# Patient Record
Sex: FEMALE | Race: WHITE | NOT HISPANIC OR LATINO | Employment: UNEMPLOYED | URBAN - METROPOLITAN AREA
[De-identification: names, ages, dates, MRNs, and addresses within clinical notes are randomized per-mention and may not be internally consistent; named-entity substitution may affect disease eponyms.]

---

## 2017-07-06 ENCOUNTER — HOSPITAL ENCOUNTER (EMERGENCY)
Facility: MEDICAL CENTER | Age: 27
End: 2017-07-06
Attending: EMERGENCY MEDICINE
Payer: MEDICAID

## 2017-07-06 VITALS
SYSTOLIC BLOOD PRESSURE: 140 MMHG | RESPIRATION RATE: 20 BRPM | BODY MASS INDEX: 19.29 KG/M2 | HEIGHT: 66 IN | WEIGHT: 120 LBS | DIASTOLIC BLOOD PRESSURE: 73 MMHG | TEMPERATURE: 98.8 F | HEART RATE: 130 BPM | OXYGEN SATURATION: 96 %

## 2017-07-06 DIAGNOSIS — Z72.0 TOBACCO USE: ICD-10-CM

## 2017-07-06 DIAGNOSIS — R21 RASH: ICD-10-CM

## 2017-07-06 PROCEDURE — 99282 EMERGENCY DEPT VISIT SF MDM: CPT

## 2017-07-06 RX ORDER — PERMETHRIN 50 MG/G
CREAM TOPICAL
Qty: 1 TUBE | Refills: 0 | Status: SHIPPED | OUTPATIENT
Start: 2017-07-06 | End: 2017-07-24

## 2017-07-06 ASSESSMENT — PAIN SCALES - GENERAL: PAINLEVEL_OUTOF10: 7

## 2017-07-06 NOTE — DISCHARGE INSTRUCTIONS
Lice, Adult  Lice are tiny insects with claws on the ends of their legs. They are small parasites that live on the human body. Lice often make their home in a person's hair, such as hair on the head or in the pubic area. Pubic lice are sometimes referred to as crabs. Lice del angel from little round eggs, which are attached to the base of hairs. Lice eggs are also called nits.  Lice cause skin irritation and itching in the area of the infested hair. Although having lice can be annoying, it is not dangerous, and lice do not spread diseases. Treatment will usually clear up the symptoms within a few days.  CAUSES  Lice can spread from one person to another. Lice crawl. They do not fly or jump. To get lice, you must:  · Have very close contact with an infested person.  · Share infested items that touch your skin and hair. These include personal items, such as hats, العلي, brushes, towels, clothing, pillowcases, or sheets.  Pubic lice spread through sexual contact.  RISK FACTORS  Although having lice is more common among young children, anyone can get lice. Lice tend to thrive in warm weather, so that type of weather increases the risk.  SIGNS AND SYMPTOMS  · Itchiness in the affected area.  · Skin irritation.  · Feeling of something moving in the hair.  · Rash or sores on the skin.  · Tiny flakes or sacs near the scalp. These may be white, yellow, or tan.  · Tiny bugs crawling on the hair or scalp.  DIAGNOSIS  Diagnosis is based on your symptoms and a physical exam. Your health care provider will examine the affected area closely for live lice, tiny eggs (nits), and empty egg cases.  Eggs are typically yellow or tan in color. Empty egg cases are whitish. Lice are gray or brown.  TREATMENT  Treatment for lice includes:  · Using a hair rinse that contains a mild insecticide to kill lice. Your health care provider will recommend a prescription or over-the-counter rinse.  · Removing lice, eggs, and egg cases by using a comb or  tweezers.  · Washing and bagging your clothing and bedding.  Pregnant women should not use medicated shampoo or cream without first talking to their health care provider.  HOME CARE INSTRUCTIONS  · Apply medicated rinse as directed by your health care provider. Follow the label instructions carefully. General instructions for applying rinses may include these steps:  ¨ Put on an old shirt or underwear, or use an old towel in case of staining from the rinse.  ¨ Wash and towel-dry your head or pubic area before applying the rinse if directed to do so.  ¨ When your hair is dry, apply the rinse. Leave the rinse in your hair for the amount of time specified in the instructions.  ¨ Rinse the area with water.  ¨ Comb your wet hair close to the skin and down to the ends, removing any lice, eggs, or egg cases.  ¨ Do not wash the infested hair for 2 days while the medicine kills the lice.  ¨ Repeat the treatment if necessary in 7-10 days.  · Check for remaining lice, eggs, or egg cases every 2-3 days for 2 weeks or as directed. After treatment, the remaining lice should be moving more slowly.  · Remove any remaining lice, eggs, or egg cases using a fine-tooth comb.  · Wash all recently used towels, hats, scarves, jackets, bedding, and clothing in hot water.  · Place unwashable items that may have been exposed in closed plastic bags for 2 weeks.  · Soak all العلي and brushes in hot water for 10 minutes.  · Vacuum furniture to remove any loose hair. There is no need to use chemicals, which can be toxic. Lice survive only 1-2 days away from human skin. Eggs may survive only 1 week.  · For pubic lice, tell any sexual partners to seek treatment.  · For head lice, ask your health care provider if other family members or close contacts should be examined or treated as well.  · Keep all follow-up visits as directed by your health care provider. This is important.  SEEK MEDICAL CARE IF:  · You develop sores that look infected.  · Your  rash or sores do not go away in 1 week.  · The lice or eggs return or do not go away in spite of treatment.  MAKE SURE YOU:  · Understand these instructions.  · Will watch your condition.  · Will get help right away if you are not doing well or get worse.     This information is not intended to replace advice given to you by your health care provider. Make sure you discuss any questions you have with your health care provider.     Document Released: 12/18/2006 Document Revised: 01/08/2016 Document Reviewed: 05/05/2015  [employer] Interactive Patient Education ©2016 [employer]

## 2017-07-06 NOTE — ED NOTES
"28 y/o female ambulatory to triage with c/o itchy rash over whole body. Rash is not visible to triage nurse, several red spots noted on pt arm and back, pt states \"things are jumping off of me, they are everywhere\". Pt appears very anxious during triage, rapid speech, fidgeting.   "

## 2017-07-06 NOTE — ED NOTES
"Pt discharged, pt asked if she \"should put the cream in her ears and nose as she feels bugs in them\" I explained no and that her children need to be seen and not ot put cream on them as well.      No IV in place and 1 prescription given   "

## 2017-07-06 NOTE — ED AVS SNAPSHOT
Home Care Instructions                                                                                                                Esmer Perdomo   MRN: [id number]    Department:  [facility], Emergency Dept   Date of Visit:  7/6/2017            [facility], Emergency Dept    [address]    [place] [address]    Phone:  [phone]      You were seen by     Vinicio Harvey M.D.      Your Diagnosis Was     Rash     R21       Medication Information     Review all of your home medications and newly ordered medications with your primary doctor and/or pharmacist as soon as possible. Follow medication instructions as directed by your doctor and/or pharmacist.     Please keep your complete medication list with you and share with your physician. Update the information when medications are discontinued, doses are changed, or new medications (including over-the-counter products) are added; and carry medication information at all times in the event of emergency situations.               Medication List      START taking these medications        Instructions    Morning Afternoon Evening Bedtime    permethrin 5 % Crea   Commonly known as:  ELIMITE        Apply head to toe, wash off after 12 hours.  Repeat in 7 days if needed.  Disp QS for 2 treatments.                          ASK your doctor about these medications        Instructions    Morning Afternoon Evening Bedtime    acetaminophen 500 MG Tabs   Commonly known as:  TYLENOL        Take 500-1,000 mg by mouth every 6 hours as needed for Moderate Pain.   Dose:  500-1000 mg                        ibuprofen 600 MG Tabs   Commonly known as:  MOTRIN        Take 1 Tab by mouth every 6 hours as needed (Cramping).   Dose:  600 mg                        PRENATAL 1 PO        Take  by mouth.                             Where to Get Your Medications      You can get these medications from any pharmacy     Bring a paper prescription for each  of these medications    - permethrin 5 % Crea              Discharge Instructions       Lice, Adult  Lice are tiny insects with claws on the ends of their legs. They are small parasites that live on the human body. Lice often make their home in a person's hair, such as hair on the head or in the pubic area. Pubic lice are sometimes referred to as crabs. Lice del angel from little round eggs, which are attached to the base of hairs. Lice eggs are also called nits.  Lice cause skin irritation and itching in the area of the infested hair. Although having lice can be annoying, it is not dangerous, and lice do not spread diseases. Treatment will usually clear up the symptoms within a few days.  CAUSES  Lice can spread from one person to another. Lice crawl. They do not fly or jump. To get lice, you must:  · Have very close contact with an infested person.  · Share infested items that touch your skin and hair. These include personal items, such as hats, العلي, brushes, towels, clothing, pillowcases, or sheets.  Pubic lice spread through sexual contact.  RISK FACTORS  Although having lice is more common among young children, anyone can get lice. Lice tend to thrive in warm weather, so that type of weather increases the risk.  SIGNS AND SYMPTOMS  · Itchiness in the affected area.  · Skin irritation.  · Feeling of something moving in the hair.  · Rash or sores on the skin.  · Tiny flakes or sacs near the scalp. These may be white, yellow, or tan.  · Tiny bugs crawling on the hair or scalp.  DIAGNOSIS  Diagnosis is based on your symptoms and a physical exam. Your health care provider will examine the affected area closely for live lice, tiny eggs (nits), and empty egg cases.  Eggs are typically yellow or tan in color. Empty egg cases are whitish. Lice are gray or brown.  TREATMENT  Treatment for lice includes:  · Using a hair rinse that contains a mild insecticide to kill lice. Your health care provider will recommend a  prescription or over-the-counter rinse.  · Removing lice, eggs, and egg cases by using a comb or tweezers.  · Washing and bagging your clothing and bedding.  Pregnant women should not use medicated shampoo or cream without first talking to their health care provider.  HOME CARE INSTRUCTIONS  · Apply medicated rinse as directed by your health care provider. Follow the label instructions carefully. General instructions for applying rinses may include these steps:  ¨ Put on an old shirt or underwear, or use an old towel in case of staining from the rinse.  ¨ Wash and towel-dry your head or pubic area before applying the rinse if directed to do so.  ¨ When your hair is dry, apply the rinse. Leave the rinse in your hair for the amount of time specified in the instructions.  ¨ Rinse the area with water.  ¨ Comb your wet hair close to the skin and down to the ends, removing any lice, eggs, or egg cases.  ¨ Do not wash the infested hair for 2 days while the medicine kills the lice.  ¨ Repeat the treatment if necessary in 7-10 days.  · Check for remaining lice, eggs, or egg cases every 2-3 days for 2 weeks or as directed. After treatment, the remaining lice should be moving more slowly.  · Remove any remaining lice, eggs, or egg cases using a fine-tooth comb.  · Wash all recently used towels, hats, scarves, jackets, bedding, and clothing in hot water.  · Place unwashable items that may have been exposed in closed plastic bags for 2 weeks.  · Soak all العلي and brushes in hot water for 10 minutes.  · Vacuum furniture to remove any loose hair. There is no need to use chemicals, which can be toxic. Lice survive only 1-2 days away from human skin. Eggs may survive only 1 week.  · For pubic lice, tell any sexual partners to seek treatment.  · For head lice, ask your health care provider if other family members or close contacts should be examined or treated as well.  · Keep all follow-up visits as directed by your health care  provider. This is important.  SEEK MEDICAL CARE IF:  · You develop sores that look infected.  · Your rash or sores do not go away in 1 week.  · The lice or eggs return or do not go away in spite of treatment.  MAKE SURE YOU:  · Understand these instructions.  · Will watch your condition.  · Will get help right away if you are not doing well or get worse.     This information is not intended to replace advice given to you by your health care provider. Make sure you discuss any questions you have with your health care provider.     Document Released: 12/18/2006 Document Revised: 01/08/2016 Document Reviewed: 05/05/2015  [employer] Interactive Patient Education ©2016 [employer]            Patient Information     Patient Information    Following emergency treatment: all patient requiring follow-up care must return either to a private physician or a clinic if your condition worsens before you are able to obtain further medical attention, please return to the emergency room.     Billing Information    At [facility], we work to make the billing process streamlined for our patients.  Our Representatives are here to answer any questions you may have regarding your hospital bill.  If you have insurance coverage and have supplied your insurance information to us, we will submit a claim to your insurer on your behalf.  Should you have any questions regarding your bill, we can be reached online or by phone as follows:  Online: You are able pay your bills online or live chat with our representatives about any billing questions you may have. We are here to help Monday - Friday from 8:00am to 7:30pm and 9:00am - 12:00pm on Saturdays.  Please visit [email or web address]  for more information.   Phone:  [phone] or [phone]    Please note that your emergency physician, surgeon, pathologist, radiologist, anesthesiologist, and other specialists are not employed by [facility] and will therefore  bill separately for their services.  Please contact them directly for any questions concerning their bills at the numbers below:     Emergency Physician Services:  [phone]  [facility]:  [phone]  [facility]:  [phone]  [facility]:  [phone]    1. Your final bill may vary from the amount quoted upon discharge if all procedures are not complete at that time, or if your doctor has additional procedures of which we are not aware. You will receive an additional bill if you return to the Emergency Department at [facility] for suture removal regardless of the facility of which the sutures were placed.     2. Please arrange for settlement of this account at the emergency registration.    3. All self-pay accounts are due in full at the time of treatment.  If you are unable to meet this obligation then payment is expected within 4-5 days.     4. If you have had radiology studies (CT, X-ray, Ultrasound, MRI), you have received a preliminary result during your emergency department visit. Please contact the radiology department [phone] to receive a copy of your final result. Please discuss the Final result with your primary physician or with the follow up physician provided.     Crisis Hotline:  Hurt Crisis Hotline:  8-364-ZQUMFVF or 1-446.123.8450  [place] Crisis Hotline:    1-438.271.7130 or 295-123-9631         ED Discharge Follow Up Questions    1. In order to provide you with very good care, we would like to follow up with a phone call in the next few days.  May we have your permission to contact you?     YES /  NO    2. What is the best phone number to call you? (       )_____-__________    3. What is the best time to call you?      Morning  /  Afternoon  /  Evening                   Patient Signature:  ____________________________________________________________    Date:  ____________________________________________________________

## 2017-07-06 NOTE — ED AVS SNAPSHOT
7/6/2017    Esmer Perdomo  [address]  [place] [address]    Dear Esmer:    [facility] wants to ensure your discharge home is safe and you or your loved ones have had all of your questions answered regarding your care after you leave the hospital.    Below is a list of resources and contact information should you have any questions regarding your hospital stay, follow-up instructions, or active medical symptoms.    Questions or Concerns Regarding… Contact   Medical Questions Related to Your Discharge  (7 days a week, 8am-5pm) Contact a Nurse Care Coordinator   [phone]   Medical Questions Not Related to Your Discharge  (24 hours a day / 7 days a week)  Contact the Nurse Health Line   [phone]    Medications or Discharge Instructions Refer to your discharge packet   or contact your [facility] Primary Care Provider   [phone]   Follow-up Appointment(s) Schedule your appointment via [employer]   or contact Scheduling [phone]   Billing Review your statement via [employer]  or contact Billing [phone]   Medical Records Review your records via [employer]   or contact Medical Records [phone]     You may receive a telephone call within two days of discharge. This call is to make certain you understand your discharge instructions and have the opportunity to have any questions answered. You can also easily access your medical information, test results and upcoming appointments via the [employer] free online health management tool. You can learn more and sign up at [employer] Tonight. For assistance setting up your [employer] account, please call [phone].    Once again, we want to ensure your discharge home is safe and that you have a clear understanding of any next steps in your care. If you have any questions or concerns, please do not hesitate to contact us, we are here for you. Thank you for choosing [facility] for your healthcare needs.    Sincerely,    Your [facility] Healthcare Team

## 2017-07-06 NOTE — ED AVS SNAPSHOT
Farfetch Access Code: Activation code not generated  Current Farfetch Status: Patient Declined    Your email address is not on file at [facility].  Email Addresses are required for you to sign up for Farfetch, please contact [phone] to verify your personal information and to provide your email address prior to attempting to register for Farfetch.    Esmer Perdomo  [address]  [place] [address]    [employer]  A secure, online tool to manage your health information     [facility]’s Farfetch® is a secure, online tool that connects you to your personalized health information from the privacy of your home -- day or night - making it very easy for you to manage your healthcare. Once the activation process is completed, you can even access your medical information using the Farfetch daiana, which is available for free in the Apple Daiana store or Google Play store.     To learn more about Farfetch, visit [email or web address]    There are two levels of access available (as shown below):   My Chart Features  [facility] Primary Care Doctor [facility]  Specialists [facility]  Urgent  Care Non-[facility] Primary Care Doctor   Email your healthcare team securely and privately 24/7 X X X    Manage appointments: schedule your next appointment; view details of past/upcoming appointments X      Request prescription refills. X      View recent personal medical records, including lab and immunizations X X X X   View health record, including health history, allergies, medications X X X X   Read reports about your outpatient visits, procedures, consult and ER notes X X X X   See your discharge summary, which is a recap of your hospital and/or ER visit that includes your diagnosis, lab results, and care plan X X  X     How to register for [employer]:  Once your e-mail address has been verified, follow the following steps to sign up for [employer].     1. Go to  [email or web address]  2. Click on the Sign Up Now box, which takes you to the New  Member Sign Up page. You will need to provide the following information:  a. Enter your [employer] Access Code exactly as it appears at the top of this page. (You will not need to use this code after you’ve completed the sign-up process. If you do not sign up before the expiration date, you must request a new code.)   b. Enter your date of birth.   c. Enter your home email address.   d. Click Submit, and follow the next screen’s instructions.  3. Create a [employer] ID. This will be your DealTraction login ID and cannot be changed, so think of one that is secure and easy to remember.  4. Create a [employer] password. You can change your password at any time.  5. Enter your Password Reset Question and Answer. This can be used at a later time if you forget your password.   6. Enter your e-mail address. This allows you to receive e-mail notifications when new information is available in [employer].  7. Click Sign Up. You can now view your health information.    For assistance activating your [employer] account, call [phone]

## 2017-07-06 NOTE — ED NOTES
Room 61    Pt states that she feels like bugs are jumping off of her.    .  Chief Complaint   Patient presents with   • Rash   • Itching

## 2017-07-06 NOTE — ED PROVIDER NOTES
"ED Provider Note    CHIEF COMPLAINT  Chief Complaint   Patient presents with   • Rash   • Itching       HPI  Esmer Perdomo is a 27 y.o. female who presents complaining of infestation of bugs. She feels like they're crawling all over her body. She points to piece of lint on the pillowcase as evidence of one of the animals. She is not sure where they are from, nobody else has this. She went to another hospital last night and was told nothing was wrong with her. She presents here for 2nd opinion. She denies drug use, then states it's \"been a while,\" then as recently as this past month. Denies any fever. No other complaint.    PAST MEDICAL HISTORY  None reported      SOCIAL HISTORY  Social History   Substance Use Topics   • Smoking status: Light Tobacco Smoker   • Smokeless tobacco: None   • Alcohol Use: Yes      Comment: occ          SURGICAL HISTORY  Past Surgical History   Procedure Laterality Date   • Eye surgery Right    • Other orthopedic surgery Right      laceration closure       CURRENT MEDICATIONS    I have reviewed the nurses notes and/or the list brought with the patient.    ALLERGIES  No Known Allergies    REVIEW OF SYSTEMS  See HPI for further details. Review of systems as above, otherwise all other systems are negative.     PHYSICAL EXAM  VITAL SIGNS: /73 mmHg  Pulse 130  Temp(Src) 37.1 °C (98.8 °F)  Resp 20  Ht 1.676 m (5' 6\")  Wt 54.432 kg (120 lb)  BMI 19.38 kg/m2  SpO2 96%  LMP 07/04/2017  Breastfeeding? Unknown  Constitutional: Very anxious, fidgety. She is up and down off of the gurney multiple times while I'm in the room..  HENT: Mucus membranes moist.  Oropharynx is clear.  Eyes: Pupils equally round.  No scleral icterus.   Neck: Full nontender range of motion.  Lymphatic: No cervical lymphadenopathy noted.   Cardiovascular: Elevated heart rate is noted.  Thorax & Lungs: Breathing easily and room air  Skin: No purpura nor petechia noted. I see no evidence of infestation. There are " a few areas of excoriation where she has been scratching.  Extremities/Musculoskeletal: No sign of trauma.    Neurologic: Alert & oriented.  Strength and sensation is intact all around.  Gait is normal.  Psychiatric: Somewhat anxious.    MEDICAL RECORD  I have reviewed patient's medical record and pertinent results are listed above.    COURSE & MEDICAL DECISION MAKING  I have reviewed any medical record information, laboratory studies and radiographic results as noted above.  Patient presents with a question of a skin infestation. See no evidence of this at thio time. However, Out of an abundance of caution, I will go ahead and treat her with Elimite cream to cover for scabies. I pointed out that there is an association between drug use and her complaint. As noted above she initially denied any drug use, and then recanted on this.  We do not have instructions for scabies, the closest thing we have our about lice she is given instructions about this.  FINAL IMPRESSION  1. Rash    2. Tobacco use     3. Methamphetamine use  4. 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    Electronically signed by: Vinicio Harvey, 7/6/2017 3:10 PM

## 2017-07-24 ENCOUNTER — HOSPITAL ENCOUNTER (EMERGENCY)
Facility: MEDICAL CENTER | Age: 27
End: 2017-07-24
Attending: EMERGENCY MEDICINE
Payer: MEDICAID

## 2017-07-24 VITALS
BODY MASS INDEX: 19.13 KG/M2 | OXYGEN SATURATION: 100 % | WEIGHT: 119.05 LBS | HEIGHT: 66 IN | SYSTOLIC BLOOD PRESSURE: 126 MMHG | HEART RATE: 86 BPM | DIASTOLIC BLOOD PRESSURE: 84 MMHG | RESPIRATION RATE: 18 BRPM | TEMPERATURE: 98.5 F
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
## 2017-07-24 PROCEDURE — 99284 EMERGENCY DEPT VISIT MOD MDM: CPT

## 2017-07-24 RX ORDER — PERMETHRIN 50 MG/G
1 CREAM TOPICAL ONCE
Qty: 1 TUBE | Refills: 1 | Status: SHIPPED | OUTPATIENT
Start: 2017-07-24 | End: 2017-07-24

## 2017-07-24 RX ORDER — PERMETHRIN 50 MG/G
CREAM TOPICAL
Qty: 1 TUBE | Refills: 0 | Status: SHIPPED | OUTPATIENT
Start: 2017-07-24 | End: 2019-08-22

## 2017-07-24 ASSESSMENT — PAIN SCALES - GENERAL: PAINLEVEL_OUTOF10: 5

## 2017-07-24 NOTE — ED AVS SNAPSHOT
[employer] Access Code: Activation code not generated  Current [employer] Status: Patient Declined    Your email address is not on file at [facility].  Email Addresses are required for you to sign up for [employer], please contact [phone] to verify your personal information and to provide your email address prior to attempting to register for [employer].    Esmer Perdomo  [address]  [place] [address]    [employer]  A secure, online tool to manage your health information     [facility]’s [employer]® is a secure, online tool that connects you to your personalized health information from the privacy of your home -- day or night - making it very easy for you to manage your healthcare. Once the activation process is completed, you can even access your medical information using the [employer] daiana, which is available for free in the Apple Daiana store or Google Play store.     To learn more about [employer], visit [email or web address]    There are two levels of access available (as shown below):   My Chart Features  [facility] Primary Care Doctor [facility]  Specialists [facility]  Urgent  Care Non-[facility] Primary Care Doctor   Email your healthcare team securely and privately 24/7 X X X    Manage appointments: schedule your next appointment; view details of past/upcoming appointments X      Request prescription refills. X      View recent personal medical records, including lab and immunizations X X X X   View health record, including health history, allergies, medications X X X X   Read reports about your outpatient visits, procedures, consult and ER notes X X X X   See your discharge summary, which is a recap of your hospital and/or ER visit that includes your diagnosis, lab results, and care plan X X  X     How to register for [employer]:  Once your e-mail address has been verified, follow the following steps to sign up for [employer].     1. Go to  [email or web address]  2. Click on the Sign Up Now box, which takes you to the New  Member Sign Up page. You will need to provide the following information:  a. Enter your [employer] Access Code exactly as it appears at the top of this page. (You will not need to use this code after you’ve completed the sign-up process. If you do not sign up before the expiration date, you must request a new code.)   b. Enter your date of birth.   c. Enter your home email address.   d. Click Submit, and follow the next screen’s instructions.  3. Create a [employer] ID. This will be your [employer] login ID and cannot be changed, so think of one that is secure and easy to remember.  4. Create a [employer] password. You can change your password at any time.  5. Enter your Password Reset Question and Answer. This can be used at a later time if you forget your password.   6. Enter your e-mail address. This allows you to receive e-mail notifications when new information is available in [employer].  7. Click Sign Up. You can now view your health information.    For assistance activating your [employer] account, call [phone]

## 2017-07-24 NOTE — ED AVS SNAPSHOT
7/24/2017    Esmer Perdomo  [address]  [place] [address]    Dear Esmer:    [facility] wants to ensure your discharge home is safe and you or your loved ones have had all of your questions answered regarding your care after you leave the hospital.    Below is a list of resources and contact information should you have any questions regarding your hospital stay, follow-up instructions, or active medical symptoms.    Questions or Concerns Regarding… Contact   Medical Questions Related to Your Discharge  (7 days a week, 8am-5pm) Contact a Nurse Care Coordinator   [phone]   Medical Questions Not Related to Your Discharge  (24 hours a day / 7 days a week)  Contact the Nurse Health Line   [phone]    Medications or Discharge Instructions Refer to your discharge packet   or contact your [facility] Primary Care Provider   [phone]   Follow-up Appointment(s) Schedule your appointment via [email or web address]   or contact Scheduling [phone]   Billing Review your statement via [email or web address]  or contact Billing [phone]   Medical Records Review your records via [email or web address]   or contact Medical Records [phone]     You may receive a telephone call within two days of discharge. This call is to make certain you understand your discharge instructions and have the opportunity to have any questions answered. You can also easily access your medical information, test results and upcoming appointments via the [email or web address] free online health management tool. You can learn more and sign up at [email or web address]. For assistance setting up your [email or web address] account, please call [phone].    Once again, we want to ensure your discharge home is safe and that you have a clear understanding of any next steps in your care. If you have any questions or concerns, please do not hesitate to contact us, we are here for you. Thank you for choosing [facility] for your healthcare needs.    Sincerely,    Your [facility] Healthcare Team

## 2017-07-24 NOTE — ED AVS SNAPSHOT
Home Care Instructions                                                                                                                Esmer Perdomo   MRN: [id number]    Department:  [facility], Emergency Dept   Date of Visit:  7/24/2017            [facility], Emergency Dept    [address]    [place] [address]    Phone:  [phone]      You were seen by     Diana Martínez M.D.      Your Diagnosis Was     Bug bites, initial encounter     W57.XXXA       Follow-up Information     1. Follow up with primary care. Call in 2 days.    Why:  for recheck      Medication Information     Review all of your home medications and newly ordered medications with your primary doctor and/or pharmacist as soon as possible. Follow medication instructions as directed by your doctor and/or pharmacist.     Please keep your complete medication list with you and share with your physician. Update the information when medications are discontinued, doses are changed, or new medications (including over-the-counter products) are added; and carry medication information at all times in the event of emergency situations.               Medication List      START taking these medications        Instructions    Morning Afternoon Evening Bedtime    Pyrethrins-Piperonyl Butoxide 0.3-3 % Liqd   Commonly known as:  RID        Apply 1 Application to affected area(s) Once for 1 dose.   Dose:  1 Application                          CHANGE how you take these medications        Instructions    Morning Afternoon Evening Bedtime    * permethrin 5 % Crea   What changed:  You were already taking a medication with the same name, and this prescription was added. Make sure you understand how and when to take each.   Commonly known as:  ELIMITE        Apply 1 Application to affected area(s) Once for 1 dose.   Dose:  1 Application                        * permethrin 5 % Crea   What changed:  Another medication  with the same name was added. Make sure you understand how and when to take each.   Commonly known as:  ELIMITE        Apply head to toe, wash off after 12 hours.  Repeat in 7 days if needed.  Disp QS for 2 treatments.                        * Notice:  This list has 2 medication(s) that are the same as other medications prescribed for you. Read the directions carefully, and ask your doctor or other care provider to review them with you.      ASK your doctor about these medications        Instructions    Morning Afternoon Evening Bedtime    acetaminophen 500 MG Tabs   Commonly known as:  TYLENOL        Take 500-1,000 mg by mouth every 6 hours as needed for Moderate Pain.   Dose:  500-1000 mg                        ibuprofen 600 MG Tabs   Commonly known as:  MOTRIN        Take 1 Tab by mouth every 6 hours as needed (Cramping).   Dose:  600 mg                        PRENATAL 1 PO        Take  by mouth.                             Where to Get Your Medications      You can get these medications from any pharmacy     Bring a paper prescription for each of these medications    - permethrin 5 % Crea  - permethrin 5 % Crea  - Pyrethrins-Piperonyl Butoxide 0.3-3 % Liqd              Discharge Instructions       Insect Bite  Mosquitoes, flies, fleas, bedbugs, and many other insects can bite. Insect bites are different from insect stings. A sting is when venom is injected into the skin. Some insect bites can transmit infectious diseases.  SYMPTOMS   Insect bites usually turn red, swell, and itch for 2 to 4 days. They often go away on their own.  TREATMENT   Your caregiver may prescribe antibiotic medicines if a bacterial infection develops in the bite.  HOME CARE INSTRUCTIONS  · Do not scratch the bite area.  · Keep the bite area clean and dry. Wash the bite area thoroughly with soap and water.  · Put ice or cool compresses on the bite area.  ¨ Put ice in a plastic bag.  ¨ Place a towel between your skin and the bag.  ¨ Leave  the ice on for 20 minutes, 4 times a day for the first 2 to 3 days, or as directed.  · You may apply a baking soda paste, cortisone cream, or calamine lotion to the bite area as directed by your caregiver. This can help reduce itching and swelling.  · Only take over-the-counter or prescription medicines as directed by your caregiver.  · If you are given antibiotics, take them as directed. Finish them even if you start to feel better.  You may need a tetanus shot if:  · You cannot remember when you had your last tetanus shot.  · You have never had a tetanus shot.  · The injury broke your skin.  If you get a tetanus shot, your arm may swell, get red, and feel warm to the touch. This is common and not a problem. If you need a tetanus shot and you choose not to have one, there is a rare chance of getting tetanus. Sickness from tetanus can be serious.  SEEK IMMEDIATE MEDICAL CARE IF:   · You have increased pain, redness, or swelling in the bite area.  · You see a red line on the skin coming from the bite.  · You have a fever.  · You have joint pain.  · You have a headache or neck pain.  · You have unusual weakness.  · You have a rash.  · You have chest pain or shortness of breath.  · You have abdominal pain, nausea, or vomiting.  · You feel unusually tired or sleepy.  MAKE SURE YOU:   · Understand these instructions.  · Will watch your condition.  · Will get help right away if you are not doing well or get worse.     This information is not intended to replace advice given to you by your health care provider. Make sure you discuss any questions you have with your health care provider.     Document Released: 01/25/2006 Document Revised: 03/11/2013 Document Reviewed: 07/18/2012  [employer] Interactive Patient Education ©2016 [employer]            Patient Information     Patient Information    Following emergency treatment: all patient requiring follow-up care must return either to a private physician or a clinic if your  condition worsens before you are able to obtain further medical attention, please return to the emergency room.     Billing Information    At [facility], we work to make the billing process streamlined for our patients.  Our Representatives are here to answer any questions you may have regarding your hospital bill.  If you have insurance coverage and have supplied your insurance information to us, we will submit a claim to your insurer on your behalf.  Should you have any questions regarding your bill, we can be reached online or by phone as follows:  Online: You are able pay your bills online or live chat with our representatives about any billing questions you may have. We are here to help Monday - Friday from 8:00am to 7:30pm and 9:00am - 12:00pm on Saturdays.  Please visit [email or web address]  for more information.   Phone:  [phone] or [phone]    Please note that your emergency physician, surgeon, pathologist, radiologist, anesthesiologist, and other specialists are not employed by [facility] and will therefore bill separately for their services.  Please contact them directly for any questions concerning their bills at the numbers below:     Emergency Physician Services:  [phone]  [facility]:  [phone]  [facility]:  [phone]  [facility]:  [phone]    1. Your final bill may vary from the amount quoted upon discharge if all procedures are not complete at that time, or if your doctor has additional procedures of which we are not aware. You will receive an additional bill if you return to the Emergency Department at [facility] for suture removal regardless of the facility of which the sutures were placed.     2. Please arrange for settlement of this account at the emergency registration.    3. All self-pay accounts are due in full at the time of treatment.  If you are unable to meet this obligation  then payment is expected within 4-5 days.     4. If you have had radiology studies (CT, X-ray, Ultrasound, MRI), you have received a preliminary result during your emergency department visit. Please contact the radiology department [phone] to receive a copy of your final result. Please discuss the Final result with your primary physician or with the follow up physician provided.     Crisis Hotline:  [place] Crisis Hotline:  4-375-MXSNDLQ or 1-230.293.6497  [place] Crisis Hotline:    1-123.510.6023 or 693-563-5969         ED Discharge Follow Up Questions    1. In order to provide you with very good care, we would like to follow up with a phone call in the next few days.  May we have your permission to contact you?     YES /  NO    2. What is the best phone number to call you? (       )_____-__________    3. What is the best time to call you?      Morning  /  Afternoon  /  Evening                   Patient Signature:  ____________________________________________________________    Date:  ____________________________________________________________

## 2017-07-25 NOTE — ED NOTES
Pt speaking very rapidly, states has been seeing bugs come out of hair and eyebrow, has not followed up or used any of the treatments received from previous visits.  Pt denies SI, denies hx of hallucinations.

## 2017-07-25 NOTE — DISCHARGE INSTRUCTIONS
Insect Bite  Mosquitoes, flies, fleas, bedbugs, and many other insects can bite. Insect bites are different from insect stings. A sting is when venom is injected into the skin. Some insect bites can transmit infectious diseases.  SYMPTOMS   Insect bites usually turn red, swell, and itch for 2 to 4 days. They often go away on their own.  TREATMENT   Your caregiver may prescribe antibiotic medicines if a bacterial infection develops in the bite.  HOME CARE INSTRUCTIONS  · Do not scratch the bite area.  · Keep the bite area clean and dry. Wash the bite area thoroughly with soap and water.  · Put ice or cool compresses on the bite area.  ¨ Put ice in a plastic bag.  ¨ Place a towel between your skin and the bag.  ¨ Leave the ice on for 20 minutes, 4 times a day for the first 2 to 3 days, or as directed.  · You may apply a baking soda paste, cortisone cream, or calamine lotion to the bite area as directed by your caregiver. This can help reduce itching and swelling.  · Only take over-the-counter or prescription medicines as directed by your caregiver.  · If you are given antibiotics, take them as directed. Finish them even if you start to feel better.  You may need a tetanus shot if:  · You cannot remember when you had your last tetanus shot.  · You have never had a tetanus shot.  · The injury broke your skin.  If you get a tetanus shot, your arm may swell, get red, and feel warm to the touch. This is common and not a problem. If you need a tetanus shot and you choose not to have one, there is a rare chance of getting tetanus. Sickness from tetanus can be serious.  SEEK IMMEDIATE MEDICAL CARE IF:   · You have increased pain, redness, or swelling in the bite area.  · You see a red line on the skin coming from the bite.  · You have a fever.  · You have joint pain.  · You have a headache or neck pain.  · You have unusual weakness.  · You have a rash.  · You have chest pain or shortness of breath.  · You have abdominal pain,  nausea, or vomiting.  · You feel unusually tired or sleepy.  MAKE SURE YOU:   · Understand these instructions.  · Will watch your condition.  · Will get help right away if you are not doing well or get worse.     This information is not intended to replace advice given to you by your health care provider. Make sure you discuss any questions you have with your health care provider.     Document Released: 01/25/2006 Document Revised: 03/11/2013 Document Reviewed: 07/18/2012  [employer] Interactive Patient Education ©2016 [employer]

## 2017-07-25 NOTE — ED NOTES
Discharge instructions and rx given, educated on when to return to ed. Pt verbalized instructions.

## 2017-07-25 NOTE — ED PROVIDER NOTES
"ED Provider Note    CHIEF COMPLAINT  Chief Complaint   Patient presents with   • Other     Pt c/o feeling \"bugs in my hair\"   • Anxiety     \"phobia of bugs preying on you\"       HPI  Esmer Perdomo is a 27 y.o. female who presents complaining of continued infestations with bedbugs. She states she has been very anxious because she has gotten bugs out of her here. She has been through permethrin treatment, but feels she needs another one. She states that she has washed all her clothes. She is worried that her daughters will get bugs on them as well. she is very anxious. She denies suicidal ideation, drug use, smoking or alcohol use.    REVIEW OF SYSTEMS  See HPI for further details. No fevers or chills. No purulent drainage from her rash.  PAST MEDICAL HISTORY  History reviewed. No pertinent past medical history.    SOCIAL HISTORY  Social History     Social History   • Marital Status:      Spouse Name: N/A   • Number of Children: N/A   • Years of Education: N/A     Social History Main Topics   • Smoking status: Current Every Day Smoker   • Smokeless tobacco: None   • Alcohol Use: Yes   • Drug Use: Yes      Comment: one month ago    • Sexual Activity: Not Asked     Other Topics Concern   • None     Social History Narrative   , immunizations up to date, no smoking in the house    SURGICAL HISTORY  Past Surgical History   Procedure Laterality Date   • Eye surgery Right    • Other orthopedic surgery Right      laceration closure       CURRENT MEDICATIONS  Home Medications     **Home medications have not yet been reviewed for this encounter**          ALLERGIES  No Known Allergies    PHYSICAL EXAM  VITAL SIGNS: /75 mmHg  Pulse 85  Temp(Src) 36.9 °C (98.5 °F)  Resp 18  Ht 1.676 m (5' 6\")  Wt 54 kg (119 lb 0.8 oz)  BMI 19.22 kg/m2  SpO2 97%  LMP 07/04/2017  Constitutional: Well developed, Well nourished, No acute distress, Non-toxic appearance.   HEENT: Normocephalic, Atraumatic,  external ears normal, " pharynx pink,  Mucous  Membranes moist, No rhinorrhea or mucosal edema  Eyes: PERRL, EOMI, Conjunctiva normal, No discharge.   Neck: Normal range of motion, No tenderness, Supple, No stridor.   Lymphatic: No lymphadenopathy  .   Skin: Warm, Dry, No erythema, positive for some mild erythema on her chin and left upper lip. She has some areas where she's been scratching in her scalp and hairline. There is no purulent drainage, tenderness or warmth. She has no other rashes or areas of possible infestation. Her back and chest and legs are not erythematous without any rash or lesion. She has no joint swelling.  Extremities: Equal, intact distal pulses, No cyanosis, No tenderness.   Musculoskeletal: Good range of motion in all major joints. No tenderness to palpation or major deformities noted. ,    Psychiatric: Anxious with pressured speech, no suicidal or homicidal ideation          COURSE & MEDICAL DECISION MAKING  Pertinent Labs & Imaging studies reviewed. (See chart for details)  Bedbugs versus anxiety    This time, the patient is so anxious that she may still have bedbugs. I will give her another treatment of rid and permethrin cream. She is not suicidal or homicidal. She is not a harm to herself or others. She denies meth use. At this time, I will discharge her home and advised her to follow up with her primary care physician for recheck and wash all of her clothing with bleach and water or have her house fumigated.        Patient has had high blood pressure while in the emergency department, felt likely secondary to medical condition. Counseled patient to monitor blood pressure at home and follow up with primary care physician.    FINAL IMPRESSION  1. Bug bites, initial encounter  2. Anxiety          PLAN/DISPOSITION  Discharged          Electronically signed by: Diana Martínez, 7/24/2017 8:39 PM

## 2017-07-25 NOTE — ED NOTES
"Chief Complaint   Patient presents with   • Other     Pt c/o feeling \"bugs in my hair\"   • Anxiety     \"phobia of bugs preying on you\"     /75 mmHg  Pulse 85  Temp(Src) 36.9 °C (98.5 °F)  Resp 18  Ht 1.676 m (5' 6\")  Wt 54 kg (119 lb 0.8 oz)  BMI 19.22 kg/m2  SpO2 97%  LMP 07/04/2017    "

## 2019-06-12 ENCOUNTER — INITIAL PRENATAL (OUTPATIENT)
Dept: OBGYN | Facility: CLINIC | Age: 29
End: 2019-06-12
Payer: MEDICAID

## 2019-06-12 VITALS — BODY MASS INDEX: 23.08 KG/M2 | WEIGHT: 143 LBS | DIASTOLIC BLOOD PRESSURE: 60 MMHG | SYSTOLIC BLOOD PRESSURE: 104 MMHG

## 2019-06-12 DIAGNOSIS — N91.2 AMENORRHEA: ICD-10-CM

## 2019-06-12 DIAGNOSIS — Z3A.18 18 WEEKS GESTATION OF PREGNANCY: ICD-10-CM

## 2019-06-12 PROCEDURE — 99203 OFFICE O/P NEW LOW 30 MIN: CPT | Performed by: OBSTETRICS & GYNECOLOGY

## 2019-06-12 NOTE — PROGRESS NOTES
Chief complaint: Amenorrhea    Esmer Perdomo,  29 y.o.  female with No LMP recorded. Patient is pregnant. presents today with complaint of dysfunctional uterine bleeding.    Patient reports last menstrual period was 2019.  Due date of 2019    Doing well overall, no complaints    Reports fetal movement    Subjective : Patient presents to the office for absent menses.    Nausea/Vomiting: No    Abdominal /pelvic cramping: No  Vaginal bleeding: No  Positive home UPT yes  Other symptoms: None    Pertinent positives documented in HPI and all other systems reviewed & are negative    OB History    Para Term  AB Living   5 2 2   2 2   SAB TAB Ectopic Molar Multiple Live Births     2     0 2      # Outcome Date GA Lbr Froy/2nd Weight Sex Delivery Anes PTL Lv   5 Current            4 Term 12/15/15 39w5d  3.365 kg (7 lb 6.7 oz) F Vag-Spont EPI  NATE   3 Term 12 38w0d  3.912 kg (8 lb 10 oz) M Vag-Vacuum   NATE   2 TAB            1 TAB                   Past Gyn history: last pap unsure, hx STDs none    Past Medical History:   Diagnosis Date   • Anxiety    • Depression        Past Surgical History:   Procedure Laterality Date   • EYE SURGERY Right    • OTHER ORTHOPEDIC SURGERY Right     laceration closure       Meds: Daily multivitamin    Allergies: Patient has no known allergies.    Physical Exam:    /60   Wt 64.9 kg (143 lb)   BMI 23.08 kg/m²   Gen: well-appearing, well-hydrated, well-nourished  HEENT: normal;   PERRLA, EOMI,  sclera clear  Lungs: Clear to auscultation  Heart: RRR No M  Abd: abdomen is soft without significant tenderness, masses, organomegaly or guarding  Pelvic: Deferred  Ext: NT bilaterally, no cyanosis, clubbing or edema    No results found for this or any previous visit (from the past 336 hour(s)).    Ultrasound: Trans-abdominal US performed and per my read:    Indication: Dating    Findings: Single intrauterine pregnancy at 18 weeks and 5 days  gestational age noted.  Positive fetal cardiac activity @144 BPM.   Right ovary no mass. Left Ovary no mass. Cervical length 3.8 cm.   No free fluid in the cul-de-sac.    Impression: viable IUP @18 weeks and 5 days. EDC by US of 2019    Ultrasound consistent with last menstrual..  Final due date 2019      Assessment:  29 y.o.   amenorrhea  Pregnancy exam/test positive    Plan:  4 weeks for new OB appt  Pap smear and cervical cultures at new OB visit  Normal pregnancy symptoms discussed  SAB/labor precautions educated  Prenatal labs ordered.  Discussed with patient quad screen for trisomy and neural tube defects.  False positive pickup rate discussed with patient.  Patient will obtain.  Order given  Drink at least 2 liters of water daily  Exercise 30 minutes daily  Call MD fitzgerald/ questions or concerns    All questions answered

## 2019-06-12 NOTE — PROGRESS NOTES
Pt here today for   Pt states feeling flutters, was on depo while incarcerated  Reports +  Good # [phone]  Pharmacy Confirmed.

## 2019-06-26 DIAGNOSIS — Z34.82 ENCOUNTER FOR SUPERVISION OF OTHER NORMAL PREGNANCY IN SECOND TRIMESTER: ICD-10-CM

## 2019-06-26 RX ORDER — VITAMIN A ACETATE, BETA CAROTENE, ASCORBIC ACID, CHOLECALCIFEROL, .ALPHA.-TOCOPHEROL ACETATE, DL-, THIAMINE MONONITRATE, RIBOFLAVIN, NIACINAMIDE, PYRIDOXINE HYDROCHLORIDE, FOLIC ACID, CYANOCOBALAMIN, CALCIUM CARBONATE, FERROUS FUMARATE, ZINC OXIDE, CUPRIC OXIDE 3080; 12; 120; 400; 1; 1.84; 3; 20; 22; 920; 25; 200; 27; 10; 2 [IU]/1; UG/1; MG/1; [IU]/1; MG/1; MG/1; MG/1; MG/1; MG/1; [IU]/1; MG/1; MG/1; MG/1; MG/1; MG/1
1 TABLET, FILM COATED ORAL
Qty: 30 TAB | Refills: 12 | Status: SHIPPED | OUTPATIENT
Start: 2019-06-26 | End: 2019-08-22

## 2019-06-26 NOTE — TELEPHONE ENCOUNTER
Ancelmo Leon  Pt called to verify on her u/s appt time on 6/27/19 was informed and instructed for it.    Also needs Rx for prenatals to pharmacy on file.    Has not other questions Rx place    compensated    Continue present Rx  Cardiac w/u in progress  CMP in am      Trac T Le  5/22/2017

## 2019-07-01 ENCOUNTER — INITIAL PRENATAL (OUTPATIENT)
Dept: OBGYN | Facility: CLINIC | Age: 29
End: 2019-07-01
Payer: MEDICAID

## 2019-07-01 ENCOUNTER — HOSPITAL ENCOUNTER (OUTPATIENT)
Facility: MEDICAL CENTER | Age: 29
End: 2019-07-01
Attending: NURSE PRACTITIONER
Payer: MEDICAID

## 2019-07-01 VITALS — DIASTOLIC BLOOD PRESSURE: 60 MMHG | SYSTOLIC BLOOD PRESSURE: 124 MMHG | BODY MASS INDEX: 23.08 KG/M2 | WEIGHT: 143 LBS

## 2019-07-01 DIAGNOSIS — Z86.59 HISTORY OF DEPRESSION: ICD-10-CM

## 2019-07-01 DIAGNOSIS — Z34.92 PRENATAL CARE IN SECOND TRIMESTER: ICD-10-CM

## 2019-07-01 DIAGNOSIS — O99.330 TOBACCO USE AFFECTING PREGNANCY, ANTEPARTUM: ICD-10-CM

## 2019-07-01 DIAGNOSIS — Z86.59 HISTORY OF ANXIETY: ICD-10-CM

## 2019-07-01 LAB
APPEARANCE UR: NORMAL
BILIRUB UR STRIP-MCNC: NORMAL MG/DL
COLOR UR AUTO: NORMAL
GLUCOSE UR STRIP.AUTO-MCNC: NEGATIVE MG/DL
KETONES UR STRIP.AUTO-MCNC: NEGATIVE MG/DL
LEUKOCYTE ESTERASE UR QL STRIP.AUTO: NEGATIVE
NITRITE UR QL STRIP.AUTO: NEGATIVE
PH UR STRIP.AUTO: 6.5 [PH] (ref 5–8)
PROT UR QL STRIP: NEGATIVE MG/DL
RBC UR QL AUTO: NEGATIVE
SP GR UR STRIP.AUTO: 1.02
UROBILINOGEN UR STRIP-MCNC: NORMAL MG/DL

## 2019-07-01 PROCEDURE — 88175 CYTOPATH C/V AUTO FLUID REDO: CPT

## 2019-07-01 PROCEDURE — 87624 HPV HI-RISK TYP POOLED RSLT: CPT

## 2019-07-01 PROCEDURE — 0500F INITIAL PRENATAL CARE VISIT: CPT | Performed by: NURSE PRACTITIONER

## 2019-07-01 PROCEDURE — 87591 N.GONORRHOEAE DNA AMP PROB: CPT

## 2019-07-01 PROCEDURE — 81002 URINALYSIS NONAUTO W/O SCOPE: CPT | Performed by: NURSE PRACTITIONER

## 2019-07-01 PROCEDURE — 87491 CHLMYD TRACH DNA AMP PROBE: CPT

## 2019-07-01 NOTE — LETTER
Cystic Fibrosis Carrier Testing  Esmer Perdomo    The following information is about a blood test that can be done to determine if you and/or your partner carry the gene for cystic fibrosis.    WHAT IS CYSTIC FIBROSIS?  · Cystic fibrosis (CF) is an inherited disease that affects more than 25,000 [place] children and young adults.  · Symptoms of CF vary but include lung congestion, pneumonia, diarrhea and poor growth.  Most people with CF have severe medical problems and some die at a young age.  Others have so few symptoms they are unaware they have CF.  · CF does not affect intelligence.  · Although there is no cure for CF at this time, scientists are making progress in improving treatment and in searching for a cure.  In the past many people with CF  at a very young age.  Today, many are living into their 20’s and 30’s.    IS THERE A CHANCE MY BABY COULD HAVE CYSTIC FIBROSIS?  · You can have a child with CF even if there is no history in your family (see chart below).  · CF testing can help determine if you are a carrier and at risk to have a child with CF.  Note: if both parents are carriers, there is a 1 in 4 (25%) chance with each pregnancy that they will have a child with CF.  · Carriers have one normal CF gene and one altered CF gene.  · People with CF have two altered CF genes.  · Most people have two normal copies of the CF gene.    Approximate risk that a couple with no family history of cystic fibrosis will have a child with cystic fibrosis:    Ethnic background / Risk     couple:  1 in 2,500   couple:  1 in 15,000            couple:  1 in 8,000     American couple:  1 in 32,000     WHAT TESTING IS AVAILABLE?  · There is a blood test that can be done to find out if you or your partner is a carrier.  · It is important to understand that CF carrier testing does not detect all CF carriers.  · If the test shows that you are both CF carriers, you unborn baby can be  tested to find out if the baby has CF.    HOW MUCH DOES IT COST TO HAVE CYSTIC FIBROSIS CARRIER TESTING?  · Cost and insurance coverage for CF carrier testing vary depending upon the laboratory used and your insurance policy.  · The average cost for CF carrier testing is $300 per person.  · Your genetic counselor can provide you with more information about cystic fibrosis carrier testing.    _____  Yes, I am interested in discussing carrier testing with a genetic counselor.    _____  No, I am not interested in CF carrier testing or in receiving more information about CF carrier testing.      Client signature: ________________________________________  7/1/2019

## 2019-07-01 NOTE — PROGRESS NOTES
NOB today. Had  6/12/19  Will be getting her prescription for PNV  Last pap. not sure  Phone # [phone]  Pharmacy confirmed  PNP and AFP not done yet. Will go ASAP  (+) FM, denies VB or LOF  C/o Some cramping.

## 2019-07-01 NOTE — PROGRESS NOTES
Subjective:   Esmer Perdomo is a 29 y.o.  who presents for her new OB exam.  She is 21w3d with an ROSEMARIE of Estimated Date of Delivery: 19 by  at 18 weeks. She is feeling well and has no concerns at this time. Denies VB, LOF, contractions or pain. No ER visits or previous care in this pregnancy. Denies dysuria, vaginal DC, fever. Reports fetal movement. Desires AFP.  Declines CF.      History reviewed. No pertinent past medical history.    Psych Hx: Reports does counseling once a week for anxiety. Denies any other psych issues.     Past Surgical History:   Procedure Laterality Date   • EYE SURGERY Right    • OTHER ORTHOPEDIC SURGERY Right     laceration closure        OB History    Para Term  AB Living   5 2 2   2 2   SAB TAB Ectopic Molar Multiple Live Births     2     0 2      # Outcome Date GA Lbr Froy/2nd Weight Sex Delivery Anes PTL Lv   5 Current            4 Term 12/15/15 39w5d  3.365 kg (7 lb 6.7 oz) F Vag-Spont EPI  NATE   3 Term 12 38w0d  3.912 kg (8 lb 10 oz) M Vag-Vacuum   NATE   2 TAB            1 TAB                    Gynecological Hx: Denies any hx of STIs, including HSV. Denies any vulvovaginal disorders and no hx of abnormal cervical cytology. Last pap a while ago.     Sexual Hx: One current male partner, who is FOB     Contraceptive Hx: Has used shot in the past and has since discontinued use.     Family History   Problem Relation Age of Onset   • Heart Disease Father    • Diabetes Father      Denies any genetic disorders in family history.     Social History     Social History   • Marital status:      Spouse name: N/A   • Number of children: N/A   • Years of education: N/A     Occupational History   • Not on file.     Social History Main Topics   • Smoking status: Current Every Day Smoker   • Smokeless tobacco: Never Used      Comment: pack at day or every 2 days   • Alcohol use No   • Drug use: No      Comment: one month ago    • Sexual activity: Yes      Partners: Male     Other Topics Concern   • Not on file     Social History Narrative   • No narrative on file       FOB is involved and does not lives with Esmer Perdomo.  She lives with her dad. Pregnancy is unplanned but desired.    She is currently not working, denies any heavy lifting or exposure to potential teratogens like environmental or occupational toxins.   Denies alcohol use, drug use, or tobacco use in pregnancy.   Denies any current or hx of sexual, emotional or physical abuse or trauma.     Current Medications: PNV   Allergies: Denies allergies to medications, food, or environmental allergies    Objective:      Vitals:    07/01/19 1607   BP: 124/60   Weight: 64.9 kg (143 lb)        See Prenatal Physical and Prenatal Vitals  UA WNL today      Assessment:      1.  IUP @ 21w3d per       2.  S=D      3.  See problem list as follows       Patient Active Problem List    Diagnosis Date Noted   • History of depression and anxiety 07/01/2019         Plan:   -  GC/CT & pap done today   - Prenatal labs already ordered; need to be done  - Reviewed quitting tobacco options and will let us know what she decides next time   - Discussed PNV, nutrition, adequate water intake, and exercise/weight gain in pregnancy  - NOB informational packet with anticipatory guidance given  - Information on Centering Pregnancy given, pt appropriate  - S/sx of pregnancy warning signs and PTL precautions given  - Complete OB US in ASAP wks  - Return to [facility] in 4 wks

## 2019-07-04 LAB
C TRACH DNA GENITAL QL NAA+PROBE: NEGATIVE
CYTOLOGY REG CYTOL: NORMAL
N GONORRHOEA DNA GENITAL QL NAA+PROBE: NEGATIVE
SPECIMEN SOURCE: NORMAL

## 2019-07-06 LAB
HPV HR 12 DNA CVX QL NAA+PROBE: NEGATIVE
HPV16 DNA SPEC QL NAA+PROBE: NEGATIVE
HPV18 DNA SPEC QL NAA+PROBE: NEGATIVE
SPECIMEN SOURCE: NORMAL

## 2019-07-08 PROBLEM — R87.610 ASCUS OF CERVIX WITH NEGATIVE HIGH RISK HPV: Status: ACTIVE | Noted: 2019-07-08

## 2019-07-17 ENCOUNTER — APPOINTMENT (OUTPATIENT)
Dept: RADIOLOGY | Facility: IMAGING CENTER | Age: 29
End: 2019-07-17
Attending: OBSTETRICS & GYNECOLOGY
Payer: MEDICAID

## 2019-07-17 DIAGNOSIS — Z3A.18 18 WEEKS GESTATION OF PREGNANCY: ICD-10-CM

## 2019-07-17 DIAGNOSIS — N91.2 AMENORRHEA: ICD-10-CM

## 2019-07-17 PROCEDURE — 76805 OB US >/= 14 WKS SNGL FETUS: CPT | Performed by: OBSTETRICS & GYNECOLOGY

## 2019-08-22 ENCOUNTER — ROUTINE PRENATAL (OUTPATIENT)
Dept: OBGYN | Facility: CLINIC | Age: 29
End: 2019-08-22
Payer: MEDICAID

## 2019-08-22 VITALS — WEIGHT: 159 LBS | BODY MASS INDEX: 25.66 KG/M2 | SYSTOLIC BLOOD PRESSURE: 110 MMHG | DIASTOLIC BLOOD PRESSURE: 58 MMHG

## 2019-08-22 DIAGNOSIS — Z34.80 SUPERVISION OF OTHER NORMAL PREGNANCY: Primary | ICD-10-CM

## 2019-08-22 PROCEDURE — 90040 PR PRENATAL FOLLOW UP: CPT | Performed by: NURSE PRACTITIONER

## 2019-08-22 NOTE — PROGRESS NOTES
Pt here today for OB follow up  Pt states no complaints   Reports +FM  Good # [phone]  Pharmacy Confirmed.  Chaperone offered and not indicated.   Pt given AMAURI sheet and instructions.  Pt declines BTL  Pt declines TDAP  Pt given 1 hr GTT and instructions.  Pt states will do labs ASAP.

## 2019-08-22 NOTE — PATIENT INSTRUCTIONS
P:  1.  PP contraception declines BTL.         2.  Instructions given on FKCs.          3.  Questions answered.          4.  Encouraged pt to tour L&D.          5.  Encourage adequate water intake.        6.  1hr GTT ordered.  Lab slip and instructions given to pt. Encouraged pt to complete all labs. Too late for AFP.          7.   labor precautions reviewed.         8.  F/u 2 wks.        9.  TDap declined.     10.  D/w pt helps for insomnia, may use benadryl or unisom at bedtime PRN.

## 2019-08-22 NOTE — PROGRESS NOTES
S:  Pt is  at 28w6d for routine OB follow up.  Reports having trouble sleeping, has not tried anything yet.  Reports good FM.  Denies VB, LOF, RUCs or vaginal DC.    O:  Please see above vitals.        FHTs: 155        Fundal ht: 29 cm.    Complete OB US   2019 12:33 PM    HISTORY/REASON FOR EXAM:  Evaluate fetal anatomy    TECHNIQUE/EXAM DESCRIPTION: OB complete ultrasound.    COMPARISON:  None    FINDINGS:  Fetal Lie:  Vertex  LMP:  2019  Clinical ROSEMARIE by LMP:  2019    Placenta (Location):  Anterior  Placenta Previa: No  Placental Grade: I    Amniotic Fluid Volume:  MIHAELA = 14.54 cm    Fetal Heart Rate:  144 bpm    Cervical Length:  5.20 cm transabdominal    No maternal adnexal mass is identified.    Umbilical Artery S/D Ratio(s):  Not applicable    Fetal Anatomy  (Seen or Not Seen)  Lateral Ventricles     Seen  Cisterna Magna        Seen  Cerebellum              Seen  CSP             Seen  Orbits             Seen  Face/Lips                Seen  Cord Insertion         Seen  Placental CI         Seen  4 Chamber Heart     Seen  LVOT               Seen  RVOT              Seen  3 Vessel View     Seen  Stomach       Seen  Kidneys                   Seen  Urinary Bladder      Seen  Spine                       Seen  3 Vessel Cord          Seen  Both Upper Extremities    Seen  Both Lower Extremities    Seen  Diaphragm             Seen  Movement       Seen  Gender:  Likely male    Fetal Biometry  BPD    5.73 cm, 23 weeks, 4 days  HC    21.73 cm, 23 weeks, 5 days  AC    19.51 cm, 24 weeks, 1 day  Femur Length    4.11 cm, 23 weeks, 2 days  Humerus Length    3.92 cm, 24 weeks  Cerebellum Diameter   2.70 cm    EGA by this US:  23 weeks, 6 days  ROSEMARIE by this US: 2019  ROSEMARIE by 1st US:  2019 by MD    Estimated Fetal Weight:  631 grams      Impression       Single intrauterine pregnancy of an estimated gestational age of 23 weeks, 6 days with an estimated date of delivery of 2019.    Fetal survey  within normal limits.         A:  IUP at 28w6d  Patient Active Problem List    Diagnosis Date Noted   • Supervision of other normal pregnancy 2019   • ASCUS of cervix with negative high risk HPV 2019   • History of anxiety 2019   • Tobacco use affecting pregnancy 2019        P:  1.  PP contraception declines BTL.         2.  Instructions given on FKCs.          3.  Questions answered.          4.  Encouraged pt to tour L&D.          5.  Encourage adequate water intake.        6.  1hr GTT ordered.  Lab slip and instructions given to pt. Encouraged pt to complete all labs. Too late for AFP.          7.   labor precautions reviewed.         8.  F/u 2 wks.        9.  TDap declined.     10.  D/w pt helps for insomnia, may use benadryl or unisom at bedtime PRN.

## 2019-08-22 NOTE — LETTER
"Count Your Baby's Movements  Another step to a healthy delivery    Lymankait Pickeringira             Dept: [phone]    How Many Weeks Pregnant? 28w6d    Date to Begin Countin19              How to use this chart    One way for your physician to keep track of your baby's health is by knowing how often the baby moves (or \"kicks\") in your womb.  You can help your physician to do this by using this chart every day.    Every day, you should see how many hours it takes for your baby to move 10 times.  Start in the morning, as soon as you get up.    · First, write down the time your baby moves until you get to 10.  · Check off one box every time your baby moves until you get to 10.  · Write down the time you finished counting in the last column.  · Total how long it took to count up all 10 movements.  · Finally, fill in the box that shows how long this took.  After counting 10 movements, you no longer have to count any more that day.  The next morning, just start counting again as soon as you get up.    What should you call a \"movement\"?  It is hard to say, because it will feel different from one mother to another and from one pregnancy to the next.  The important thing is that you count the movements the same way throughout your pregnancy.  If you have more questions, you should ask your physician.    Count carefully every day!  SAMPLE:  Week 28    How many hours did it take to feel 10 movements?       Start  Time     1     2     3     4     5     6     7     8     9     10   Finish Time   Mon 8:20 ·  ·  ·  ·  ·  ·  ·  ·  ·  ·  11:40                  Sat               Sun                 IMPORTANT: You should contact your physician if it takes more than two hours for you to feel 10 movements.  Each morning, write down the time and start to count the movements of your baby.  Keep track by checking off one box every time you feel one movement.  When you have felt " "10 \"kicks\", write down the time you finished counting in the last column.  Then fill in the   box (over the check angel) for the number of hours it took.  Be sure to read the complete instructions on the previous page.            "

## 2019-09-10 ENCOUNTER — HOSPITAL ENCOUNTER (OUTPATIENT)
Dept: LAB | Facility: MEDICAL CENTER | Age: 29
End: 2019-09-10
Attending: OBSTETRICS & GYNECOLOGY
Payer: MEDICAID

## 2019-09-10 DIAGNOSIS — Z3A.18 18 WEEKS GESTATION OF PREGNANCY: ICD-10-CM

## 2019-09-10 DIAGNOSIS — N91.2 AMENORRHEA: ICD-10-CM

## 2019-09-10 LAB
ABO GROUP BLD: NORMAL
APPEARANCE UR: ABNORMAL
BACTERIA #/AREA URNS HPF: ABNORMAL /HPF
BASOPHILS # BLD AUTO: 0.4 % (ref 0–1.8)
BASOPHILS # BLD: 0.04 K/UL (ref 0–0.12)
BILIRUB UR QL STRIP.AUTO: NEGATIVE
BLD GP AB SCN SERPL QL: NORMAL
COLOR UR: YELLOW
EOSINOPHIL # BLD AUTO: 0.08 K/UL (ref 0–0.51)
EOSINOPHIL NFR BLD: 0.8 % (ref 0–6.9)
EPI CELLS #/AREA URNS HPF: ABNORMAL /HPF
ERYTHROCYTE [DISTWIDTH] IN BLOOD BY AUTOMATED COUNT: 46.5 FL (ref 35.9–50)
GLUCOSE UR STRIP.AUTO-MCNC: NEGATIVE MG/DL
HBV SURFACE AG SER QL: NEGATIVE
HCT VFR BLD AUTO: 34.7 % (ref 37–47)
HGB BLD-MCNC: 11.2 G/DL (ref 12–16)
HIV 1+2 AB+HIV1 P24 AG SERPL QL IA: NON REACTIVE
IMM GRANULOCYTES # BLD AUTO: 0.13 K/UL (ref 0–0.11)
IMM GRANULOCYTES NFR BLD AUTO: 1.3 % (ref 0–0.9)
KETONES UR STRIP.AUTO-MCNC: NEGATIVE MG/DL
LEUKOCYTE ESTERASE UR QL STRIP.AUTO: ABNORMAL
LYMPHOCYTES # BLD AUTO: 2.04 K/UL (ref 1–4.8)
LYMPHOCYTES NFR BLD: 19.9 % (ref 22–41)
MCH RBC QN AUTO: 33 PG (ref 27–33)
MCHC RBC AUTO-ENTMCNC: 32.3 G/DL (ref 33.6–35)
MCV RBC AUTO: 102.4 FL (ref 81.4–97.8)
MICRO URNS: ABNORMAL
MONOCYTES # BLD AUTO: 1 K/UL (ref 0–0.85)
MONOCYTES NFR BLD AUTO: 9.8 % (ref 0–13.4)
MUCOUS THREADS #/AREA URNS HPF: ABNORMAL /HPF
NEUTROPHILS # BLD AUTO: 6.95 K/UL (ref 2–7.15)
NEUTROPHILS NFR BLD: 67.8 % (ref 44–72)
NITRITE UR QL STRIP.AUTO: NEGATIVE
NRBC # BLD AUTO: 0 K/UL
NRBC BLD-RTO: 0 /100 WBC
PH UR STRIP.AUTO: 7 [PH] (ref 5–8)
PLATELET # BLD AUTO: 278 K/UL (ref 164–446)
PMV BLD AUTO: 9.1 FL (ref 9–12.9)
PROT UR QL STRIP: NEGATIVE MG/DL
RBC # BLD AUTO: 3.39 M/UL (ref 4.2–5.4)
RBC UR QL AUTO: NEGATIVE
RH BLD: NORMAL
RUBV AB SER QL: 34.2 IU/ML
SP GR UR STRIP.AUTO: 1.01
TREPONEMA PALLIDUM IGG+IGM AB [PRESENCE] IN SERUM OR PLASMA BY IMMUNOASSAY: NON REACTIVE
UROBILINOGEN UR STRIP.AUTO-MCNC: 0.2 MG/DL
WBC # BLD AUTO: 10.2 K/UL (ref 4.8–10.8)
WBC #/AREA URNS HPF: ABNORMAL /HPF

## 2019-09-10 PROCEDURE — 87389 HIV-1 AG W/HIV-1&-2 AB AG IA: CPT

## 2019-09-10 PROCEDURE — 86762 RUBELLA ANTIBODY: CPT

## 2019-09-10 PROCEDURE — 81001 URINALYSIS AUTO W/SCOPE: CPT

## 2019-09-10 PROCEDURE — 36415 COLL VENOUS BLD VENIPUNCTURE: CPT

## 2019-09-10 PROCEDURE — 85025 COMPLETE CBC W/AUTO DIFF WBC: CPT

## 2019-09-10 PROCEDURE — 86850 RBC ANTIBODY SCREEN: CPT

## 2019-09-10 PROCEDURE — 86900 BLOOD TYPING SEROLOGIC ABO: CPT

## 2019-09-10 PROCEDURE — 86901 BLOOD TYPING SEROLOGIC RH(D): CPT

## 2019-09-10 PROCEDURE — 86780 TREPONEMA PALLIDUM: CPT

## 2019-09-10 PROCEDURE — 87340 HEPATITIS B SURFACE AG IA: CPT

## 2019-09-25 ENCOUNTER — OFFICE VISIT (OUTPATIENT)
Dept: URGENT CARE | Facility: CLINIC | Age: 29
End: 2019-09-25
Payer: MEDICAID

## 2019-09-25 VITALS
HEIGHT: 66 IN | DIASTOLIC BLOOD PRESSURE: 70 MMHG | SYSTOLIC BLOOD PRESSURE: 120 MMHG | HEART RATE: 113 BPM | TEMPERATURE: 97.8 F | RESPIRATION RATE: 16 BRPM | BODY MASS INDEX: 27.19 KG/M2 | WEIGHT: 169.2 LBS | OXYGEN SATURATION: 97 %

## 2019-09-25 DIAGNOSIS — H61.23 BILATERAL IMPACTED CERUMEN: ICD-10-CM

## 2019-09-25 PROCEDURE — 69210 REMOVE IMPACTED EAR WAX UNI: CPT | Performed by: NURSE PRACTITIONER

## 2019-09-25 NOTE — PROGRESS NOTES
Chief Complaint   Patient presents with   • Ear Fullness     left ear, cannot hear in it, no pain, feels like there is alot of wax x 1 month       HISTORY OF PRESENT ILLNESS: Patient is a 29 y.o. female who presents to urgent care today with complaints of left sided ear fullness.  She has had symptoms for the past month without improvement.  She denies any ear pain, fever, chills, malaise.  She has not tried anything for symptom relief.  She denies previous history of the same.    Patient Active Problem List    Diagnosis Date Noted   • Supervision of other normal pregnancy 08/22/2019   • ASCUS of cervix with negative high risk HPV 07/08/2019   • History of anxiety 07/01/2019   • Tobacco use affecting pregnancy 07/01/2019       Allergies:Patient has no known allergies.    Current Outpatient Medications Ordered in Epic   Medication Sig Dispense Refill   • acetaminophen (TYLENOL) 500 MG Tab Take 500-1,000 mg by mouth every 6 hours as needed for Moderate Pain.     • Prenatal MV-Min-Fe Fum-FA-DHA (PRENATAL 1 PO) Take  by mouth.       No current Epic-ordered facility-administered medications on file.        History reviewed. No pertinent past medical history.    Social History     Tobacco Use   • Smoking status: Current Every Day Smoker   • Smokeless tobacco: Never Used   • Tobacco comment: pack at day or every 2 days   Substance Use Topics   • Alcohol use: No   • Drug use: No     Comment: one month ago        Family Status   Relation Name Status   • Mo  Alive   • Fa  Alive     Family History   Problem Relation Age of Onset   • Heart Disease Father    • Diabetes Father        ROS:  Review of Systems   Constitutional: Negative for fever, chills, weight loss, malaise, and fatigue.   HENT: Positive for left ear fullness.  Negative for ear pain, nosebleeds, congestion, sore throat and neck pain.    Eyes: Negative for vision changes.   Neuro: Negative for headache, sensory changes, weakness, seizure, LOC.   Cardiovascular:  "Negative for chest pain, palpitations, orthopnea and leg swelling.   Respiratory: Negative for cough, sputum production, shortness of breath and wheezing.   Gastrointestinal: Negative for abdominal pain, nausea, vomiting or diarrhea.   Genitourinary: Negative for dysuria, urgency and frequency.  Musculoskeletal: Negative for falls, neck pain, back pain, joint pain, myalgias.   Skin: Negative for rash, diaphoresis.     Exam:  /70   Pulse (!) 113   Temp 36.6 °C (97.8 °F) (Temporal)   Resp 16   Ht 1.676 m (5' 6\")   Wt 76.7 kg (169 lb 3.2 oz)   SpO2 97%   General: well-nourished, well-developed female in NAD  Head: normocephalic, atraumatic  Eyes: PERRLA, no conjunctival injection, acuity grossly intact, lids normal.  Ears: normal shape and symmetry, no tenderness, no discharge. External canals are without any significant edema or erythema.  Unable to visualize tympanic membranes due to cerumen impaction.  Gross auditory acuity is intact.  Nose: symmetrical without tenderness, no discharge.  Mouth/Throat: reasonable hygiene, no erythema, exudates or tonsillar enlargement.  Neck: no masses, range of motion within normal limits, no tracheal deviation. No obvious thyroid enlargement.   Lymph: no cervical adenopathy. No supraclavicular adenopathy.   Neuro: alert and oriented. Cranial nerves 1-12 grossly intact. No sensory deficit.   Cardiovascular: regular rate and rhythm. No edema.  Pulmonary: no distress. Chest is symmetrical with respiration, no wheezes, crackles, or rhonchi.   Musculoskeletal: no clubbing, appropriate muscle tone, gait is stable.  Skin: warm, dry, intact, no clubbing, no cyanosis, no rashes.   Psych: appropriate mood, affect, judgement.         Assessment/Plan:  1. Bilateral impacted cerumen           Procedure: Cerumen Removal  Risks and benefits of procedure discussed  Cerumen removed with curette and lavage after softening agent instilled  Patient tolerated well  Post procedure exam with " left clear canal and normal TM, unable to remove all of cerumen from right canal        Left side removed with improvement.  Unable to remove total cerumen from right side.  The patient is instructed to use OTC Debrox and return to the clinic in 2 days for reattempt.  Supportive care, differential diagnoses, and indications for immediate follow-up discussed with patient.   Pathogenesis of diagnosis discussed including typical length and natural progression.   Instructed to return to clinic or nearest emergency department for any change in condition, further concerns, or worsening of symptoms.  Patient states understanding of the plan of care and discharge instructions.  Instructed to make an appointment, for follow up, with her primary care provider.        Please note that this dictation was created using voice recognition software. I have made every reasonable attempt to correct obvious errors, but I expect that there are errors of grammar and possibly content that I did not discover before finalizing the note.      MELANIA Peters.

## 2019-09-30 ENCOUNTER — HOSPITAL ENCOUNTER (OUTPATIENT)
Facility: MEDICAL CENTER | Age: 29
End: 2019-09-30
Attending: OBSTETRICS & GYNECOLOGY | Admitting: OBSTETRICS & GYNECOLOGY
Payer: MEDICAID

## 2019-09-30 ENCOUNTER — HOSPITAL ENCOUNTER (EMERGENCY)
Facility: MEDICAL CENTER | Age: 29
End: 2019-09-30
Payer: MEDICAID

## 2019-09-30 VITALS
DIASTOLIC BLOOD PRESSURE: 90 MMHG | WEIGHT: 169.97 LBS | TEMPERATURE: 96.8 F | OXYGEN SATURATION: 100 % | HEIGHT: 66 IN | BODY MASS INDEX: 27.32 KG/M2 | HEART RATE: 122 BPM | RESPIRATION RATE: 18 BRPM | SYSTOLIC BLOOD PRESSURE: 137 MMHG

## 2019-09-30 VITALS
HEART RATE: 112 BPM | DIASTOLIC BLOOD PRESSURE: 74 MMHG | BODY MASS INDEX: 26.36 KG/M2 | SYSTOLIC BLOOD PRESSURE: 112 MMHG | HEIGHT: 66 IN | WEIGHT: 164 LBS

## 2019-09-30 PROCEDURE — 59025 FETAL NON-STRESS TEST: CPT

## 2019-09-30 PROCEDURE — A9270 NON-COVERED ITEM OR SERVICE: HCPCS | Performed by: OBSTETRICS & GYNECOLOGY

## 2019-09-30 PROCEDURE — 99213 OFFICE O/P EST LOW 20 MIN: CPT | Mod: 25 | Performed by: OBSTETRICS & GYNECOLOGY

## 2019-09-30 PROCEDURE — 700102 HCHG RX REV CODE 250 W/ 637 OVERRIDE(OP): Performed by: OBSTETRICS & GYNECOLOGY

## 2019-09-30 PROCEDURE — 59025 FETAL NON-STRESS TEST: CPT | Performed by: OBSTETRICS & GYNECOLOGY

## 2019-09-30 PROCEDURE — 302449 STATCHG TRIAGE ONLY (STATISTIC)

## 2019-09-30 RX ADMIN — TERCONAZOLE 1 APPLICATOR: 4 CREAM VAGINAL at 07:22

## 2019-09-30 ASSESSMENT — PAIN SCALES - GENERAL: PAINLEVEL: 3

## 2019-09-30 NOTE — PROGRESS NOTES
"S: Pt is a 29 y.o.  at 34w3d with Estimated Date of Delivery: 19 who presents to triage c/o vaginal itching.  Patient states she also feels a bump at her vulva.  Patient states the itching is very intense and it keeps her up at night  No VB, RUCs, LOF.        O: Ht 1.676 m (5' 6\")   Wt 74.4 kg (164 lb)   BMI 26.47 kg/m²          NST: Done and read by me         Indication: Decreased fetal movement       FHR: 120       Variability: moderate       Acclerations: yes       Decelerations: no       Reactive: yes         [place]: no UCs       SSE: There is a 5 mm verrucous papule located at the external lateral portion of the labia minora with overlying keratinization.  There is white cheesy discharge coming from the vaginal vault  Cervical exam is deferred         A/P  Patient Active Problem List    Diagnosis Date Noted   • Supervision of other normal pregnancy 2019   • ASCUS of cervix with negative high risk HPV 2019   • History of anxiety 2019   • Tobacco use affecting pregnancy 2019       1.  IUP @ 34w3d  2.  Reactive NST.  3.  Vulvar verruca.  Briefly discussed with the patient treatment options include watchful waiting, Condylox which is not indicated in pregnancy, and excision.  Patient is to follow-up in the office for further discussion.  4.  Vulvovaginal candidiasis: Patient was given a course of Terazol 7 to be used at home  5.  F/u in the office within 1 week.  6.  Patient also has an uncertain history of possible HSV outbreak approximately 10 years ago.  Advised her that she needs prophylaxis starting at 36 weeks in order to prevent an outbreak in pregnancy.  Advised that HSV outbreak is contraindicated for a vaginal delivery and that she would be having a  if she has any genital herpes outbreaks in labor      Evaluation and clinical decision making , including analysis of Fetal data and maternal lab work completed over a 40min period.       Marilynn Monroe DO        "

## 2019-09-30 NOTE — PROGRESS NOTES
0700-report received from HEATHER Peña RN, care assumed, POC discussed,   0720-cream given to pt, discharge order received from Dr Monroe  0725-pt discharged home with PTL precautions and vaginitis information, instructed pt to follow up in office, verbalized understanding, left ambulatory on her own

## 2019-09-30 NOTE — PROGRESS NOTES
0600- Pt presented to labor unit for vaginal itching. States started 2 days ago and became progressively worse. No vaginal discharge noted. Pt was tested for various STD's in MD office last week Denies UC's, VB, LOF. Abd soft and non tender to touch. EFM and TOCO applied. POC discussed. Pt verbalized understanding.     0605- Dr. Monroe notified of pt status. En rout to triage to visualized site.     0620- Dr. Monroe at bedside.

## 2019-09-30 NOTE — DISCHARGE INSTRUCTIONS
Vaginitis  Vaginitis is an inflammation of the vagina. It can happen when the normal bacteria and yeast in the vagina grow too much. There are different types. Treatment will depend on the type you have.  Follow these instructions at home:  · Take all medicines as told by your doctor.  · Keep your vagina area clean and dry. Avoid soap. Rinse the area with water.  · Avoid washing and cleaning out the vagina (douching).  · Do not use tampons or have sex (intercourse) until your treatment is done.  · Wipe from front to back after going to the restroom.  · Wear cotton underwear.  · Avoid wearing underwear while you sleep until your vaginitis is gone.  · Avoid tight pants. Avoid underwear or nylons without a cotton panel.  · Take off wet clothing (such as a bathing suit) as soon as you can.  · Use mild, unscented products. Avoid fabric softeners and scented:  ¨ Feminine sprays.  ¨ Laundry detergents.  ¨ Tampons.  ¨ Soaps or bubble baths.  · Practice safe sex and use condoms.  Get help right away if:  · You have belly (abdominal) pain.  · You have a fever or lasting symptoms for more than 2-3 days.  · You have a fever and your symptoms suddenly get worse.  This information is not intended to replace advice given to you by your health care provider. Make sure you discuss any questions you have with your health care provider.  Document Released: 03/16/2010 Document Revised: 05/25/2017 Document Reviewed: 05/30/2013  [employer] Interactive Patient Education © 2017 [employer]

## 2019-10-02 ENCOUNTER — TELEPHONE (OUTPATIENT)
Dept: OBGYN | Facility: CLINIC | Age: 29
End: 2019-10-02

## 2019-10-02 NOTE — TELEPHONE ENCOUNTER
Pt called states she was told on the ED that she is HPV positive and has yeast infection (medication given for yeast) states has some bumps in vag area and was told that it is genital warts, states is been itchy all over her body (pt contradict herself).denies other complications.   Advised to use caladryl or Benadryl OTC, an otmeal bath for the itch. Avoid exposure to new soap, detergent, wash new cloths.  Scheduled for a f/u on 10/4/19  Verbalized understanding

## 2019-10-03 ENCOUNTER — TELEPHONE (OUTPATIENT)
Dept: OBGYN | Facility: CLINIC | Age: 29
End: 2019-10-03

## 2019-10-03 NOTE — TELEPHONE ENCOUNTER
----- Message from Esmer Perdomo sent at 10/2/2019  7:31 AM PDT -----  Regarding: RE: Non-Urgent Medical Question  Contact: [phone]  did I get the appt today? Who's med assist are you for    10/3/19 Spoke with pt and offered an appt for today at 1100 am, pt declines.  Pt states itching is tolerable and has applied tea tree oil and has appt scheduled for tomorrow 10/4/19    ----- Message -----  From: Rick Pemberton Ass't  Sent: 9/30/19, 2:09 PM  To: Esmer Perdomo  Subject: RE: Non-Urgent Medical Question    Esmer,    I have a 9:30 appt on Wednesday available. Let me know as I do not know how long this appt will be available    Abisai    ----- Message -----     From: Esmer Perdomo     Sent: 9/30/2019  3:24 AM PDT       To: ERIKA MichellePJairoRJairoNJairo  Subject: Non-Urgent Medical Question    Ok I am a little confused am I 100% negative for hpv I don't know what's going on with me but I have severe itching like it's very dry down there and there's also a bump. I don't know if I have a yeast infection and the bump came from bleeding from dryness or somehow I have contracted hpv I cannot sleep it's is very bothersome. Not sure if you can make me an appt ASAP I've missed my last two doctors appt with women's health. Also how can I make the itching stop and if I touch it down there will just washing my hands clean my hands free of it.

## 2019-10-13 NOTE — PROGRESS NOTES
S) Pt is a 29 y.o.   at 36w2d  gestation. Routine prenatal care today. Has not done 3rd trimester labs.  Discussed implications of untreated GDM including increased risk of stillbirth, macrosomia, shoulder dystocia and blood sugar problems in baby after delivery.    Fetal movement Normal  Cramping no  VB no  LOF no   Denies dysuria. Generally feels well today. Good self-care activities identified. Denies headaches, swelling, visual changes, or epigastric pain .     O) There were no vitals taken for this visit.        Labs: has not done       PNL: WNL       GCT: has not done       AFP: Not Examined       GBS: collected today       Pertinent ultrasound - 19 MIHAELA 14.54, survey WNL, c/w prev dating           A) IUP at 36w2d       S=D         Patient Active Problem List    Diagnosis Date Noted   • Supervision of other normal pregnancy 2019   • ASCUS of cervix with negative high risk HPV 2019   • History of anxiety 2019   • Tobacco use affecting pregnancy 2019          SVE: deferred         TDAP: yes       FLU: no        BTL: no       : no       C/S Consent: no       IOL or C/S scheduled: no       LAST PAP: 2019 ASCUS but no HR HPV, repeat pap 3 yrs per ASCCP guidelines         P) s/s ptl vs general discomforts. Fetal movements reviewed. General ed and anticipatory guidance. Nutrition/exercise/vitamin. Plans breast Plans pp contraception- unsure  Continue PNV.   GBS collected.  Aware will sign refusal of treatment form if does not do GTT by next visit.    RTC 1 week or PRN.   S/p TDAP today.

## 2019-10-14 ENCOUNTER — HOSPITAL ENCOUNTER (OUTPATIENT)
Facility: MEDICAL CENTER | Age: 29
End: 2019-10-14
Attending: NURSE PRACTITIONER
Payer: MEDICAID

## 2019-10-14 ENCOUNTER — ROUTINE PRENATAL (OUTPATIENT)
Dept: OBGYN | Facility: CLINIC | Age: 29
End: 2019-10-14
Payer: MEDICAID

## 2019-10-14 VITALS — WEIGHT: 176 LBS | SYSTOLIC BLOOD PRESSURE: 120 MMHG | DIASTOLIC BLOOD PRESSURE: 64 MMHG | BODY MASS INDEX: 28.41 KG/M2

## 2019-10-14 DIAGNOSIS — Z34.83 ENCOUNTER FOR SUPERVISION OF OTHER NORMAL PREGNANCY, THIRD TRIMESTER: ICD-10-CM

## 2019-10-14 DIAGNOSIS — Z34.83 ENCOUNTER FOR SUPERVISION OF OTHER NORMAL PREGNANCY, THIRD TRIMESTER: Primary | ICD-10-CM

## 2019-10-14 PROCEDURE — 87150 DNA/RNA AMPLIFIED PROBE: CPT

## 2019-10-14 PROCEDURE — 90040 PR PRENATAL FOLLOW UP: CPT | Performed by: NURSE PRACTITIONER

## 2019-10-14 PROCEDURE — 87081 CULTURE SCREEN ONLY: CPT

## 2019-10-14 PROCEDURE — 90471 IMMUNIZATION ADMIN: CPT | Performed by: NURSE PRACTITIONER

## 2019-10-14 PROCEDURE — 90715 TDAP VACCINE 7 YRS/> IM: CPT | Performed by: NURSE PRACTITIONER

## 2019-10-14 RX ORDER — DIPHENHYDRAMINE HCL 12.5MG/5ML
12.5 LIQUID (ML) ORAL 4 TIMES DAILY PRN
Status: ON HOLD | COMMUNITY
End: 2019-10-30

## 2019-10-14 NOTE — PROGRESS NOTES
Pt here today for OB follow up  Reports +FM  WT: 176 lb  BP: 120/64  Pt states she has not bee able to sleep, states having constipation.    Has not done the 1 hr gtt. H/H and T.Pallidum lab slips ordered today. Instructions given today.    Desires Tdap vaccine   Declines BTL  Good # [phone]    Tdap vaccine given. Left Deltoid. VIS given and screening check list reviewed with pt.  Tdap vaccine verified by Colleen POSEY

## 2019-10-16 LAB — GP B STREP DNA SPEC QL NAA+PROBE: NEGATIVE

## 2019-10-24 ENCOUNTER — HOSPITAL ENCOUNTER (OUTPATIENT)
Dept: LAB | Facility: MEDICAL CENTER | Age: 29
End: 2019-10-24
Attending: NURSE PRACTITIONER
Payer: MEDICAID

## 2019-10-24 ENCOUNTER — ROUTINE PRENATAL (OUTPATIENT)
Dept: OBGYN | Facility: CLINIC | Age: 29
End: 2019-10-24
Payer: MEDICAID

## 2019-10-24 ENCOUNTER — HOSPITAL ENCOUNTER (OUTPATIENT)
Dept: LAB | Facility: MEDICAL CENTER | Age: 29
End: 2019-10-24
Attending: ADVANCED PRACTICE MIDWIFE
Payer: MEDICAID

## 2019-10-24 VITALS — BODY MASS INDEX: 29.38 KG/M2 | SYSTOLIC BLOOD PRESSURE: 122 MMHG | DIASTOLIC BLOOD PRESSURE: 78 MMHG | WEIGHT: 182 LBS

## 2019-10-24 DIAGNOSIS — O26.843 UTERINE SIZE DATE DISCREPANCY PREGNANCY, THIRD TRIMESTER: ICD-10-CM

## 2019-10-24 DIAGNOSIS — Z34.80 SUPERVISION OF OTHER NORMAL PREGNANCY: ICD-10-CM

## 2019-10-24 DIAGNOSIS — Z34.83 ENCOUNTER FOR SUPERVISION OF OTHER NORMAL PREGNANCY, THIRD TRIMESTER: ICD-10-CM

## 2019-10-24 LAB
EST. AVERAGE GLUCOSE BLD GHB EST-MCNC: 117 MG/DL
HBA1C MFR BLD: 5.7 % (ref 0–5.6)
HCT VFR BLD AUTO: 32.6 % (ref 37–47)
HGB BLD-MCNC: 10.2 G/DL (ref 12–16)

## 2019-10-24 PROCEDURE — 90040 PR PRENATAL FOLLOW UP: CPT | Performed by: ADVANCED PRACTICE MIDWIFE

## 2019-10-24 PROCEDURE — 85014 HEMATOCRIT: CPT

## 2019-10-24 PROCEDURE — 86780 TREPONEMA PALLIDUM: CPT

## 2019-10-24 PROCEDURE — 83036 HEMOGLOBIN GLYCOSYLATED A1C: CPT

## 2019-10-24 PROCEDURE — 85018 HEMOGLOBIN: CPT

## 2019-10-24 PROCEDURE — 36415 COLL VENOUS BLD VENIPUNCTURE: CPT

## 2019-10-24 RX ORDER — HYDROXYZINE 50 MG/1
50 TABLET, FILM COATED ORAL NIGHTLY PRN
Qty: 15 TAB | Refills: 0 | Status: ON HOLD | OUTPATIENT
Start: 2019-10-24 | End: 2019-10-30

## 2019-10-24 NOTE — PROGRESS NOTES
SUBJECTIVE:  Pt is a 29 y.o.   at 37w6d  gestation. Presents today for follow-up prenatal care. Has not been seen in ER or L & D since last visit. Reports good  fetal movement. Denies leaking of fluid dysuria, headaches, or N/V at this time.  Patient does not report cramping/contractions. Generally feels well today. 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 Trouble falling and staying asleep. Tried unisom, tylenol PM, benadryl.     Regarding first birth, vacuum assist related to prolonged labor, chorio, and maternal exhaustion.     OBJECTIVE:   Wt 82.6 kg (182 lb)   BMI 29.38 kg/m²   Patients' weight gain, fluid intake and exercise level discussed.  Vitals, fundal height , fetal position, and FHR reviewed on flowsheet    Lab:  Recent Results (from the past 336 hour(s))   GRP B STREP, BY PCR ([facility]WEENEY BROTH)    Collection Time: 10/14/19  4:36 PM   Result Value Ref Range    Strep Gp B DNA PCR Negative Negative     Bedside US- indication S>D    Placenta: grade III, fundal    FHR: 133 bpm    BPD   9.45 cm  38w4d  HC  33.65 cm 38w5d  AC  36.92 cm 40w6d  FL  7.42 cm 38w0d  FL/BPD 0.78  FL/AC  0.20  BPD/FL 1.27  HC/AC  0.91    EFW: 3943g  MIHAELA: 18.28 cm      Impression: Single, intrauterine pregnancy at 39 weeks 0 days.       ASSESSMENT/ PLAN:   - IUP at 37w6d    - S > D   -   Patient Active Problem List    Diagnosis Date Noted   • Supervision of other normal pregnancy 2019   • ASCUS of cervix with negative high risk HPV 2019   • History of anxiety 2019   • Tobacco use affecting pregnancy 2019       Discussed with patient risks associated with undiagnosed GDM including macrosomia, shoulder dystocia, operative vaginal delivery, as well as increased incidence of NICU admission for infant. I also reviewed that if in fact she has undiagnosed anemia, treatment prior to delivery is preferred. Thus she places herself at higher risk for necessity of blood products from acute  blood loss. Finally, third trimester syphilis was declined as part of 28 week labs.  We discussed that undiagnosed/untreated syphilis can lead to a cascade of complications for baby including subsequent death. She voices understanding and reports desire to complete only syphilis and cbc. She signed refusal form today. I have added A1C to today's labs as well.     I discussed ultrasound findings in detail with patient today. Although she had vacuum assist with first baby, she reports that this was related to exhaustion.     Regarding her insomnia, we discussed good sleep hygiene as well as use of PRN Unisom or Benadryl. Since these have not worked in the past, vistaril sent for patient.     IOL request sent today    - S/sx pregnancy and labor warning signs vs general discomforts discussed  - Fetal movements and kick counts reviewed. Adequate hydration reinforced  - Anticipatory guidance provided.   - RTC in 1 weeks for routine prenatal care.

## 2019-10-24 NOTE — PROGRESS NOTES
OB follow up   + fetal movement.  No VB, LOF or UC's.  Wt:182lb       BP:122/78  Phone # [phone]  Preferred pharmacy confirmed.  Asking for medication to be able to sleep  Labs not done yet, patient wants to sign refusal  GBS negative

## 2019-10-25 LAB — TREPONEMA PALLIDUM IGG+IGM AB [PRESENCE] IN SERUM OR PLASMA BY IMMUNOASSAY: NON REACTIVE

## 2019-10-27 ENCOUNTER — HOSPITAL ENCOUNTER (OUTPATIENT)
Facility: MEDICAL CENTER | Age: 29
End: 2019-10-27
Attending: OBSTETRICS & GYNECOLOGY | Admitting: OBSTETRICS & GYNECOLOGY
Payer: MEDICAID

## 2019-10-27 ENCOUNTER — HOSPITAL ENCOUNTER (INPATIENT)
Facility: MEDICAL CENTER | Age: 29
LOS: 3 days | End: 2019-10-30
Attending: OBSTETRICS & GYNECOLOGY | Admitting: OBSTETRICS & GYNECOLOGY
Payer: MEDICAID

## 2019-10-27 VITALS
HEIGHT: 66 IN | SYSTOLIC BLOOD PRESSURE: 140 MMHG | WEIGHT: 181.99 LBS | HEART RATE: 94 BPM | BODY MASS INDEX: 29.25 KG/M2 | DIASTOLIC BLOOD PRESSURE: 84 MMHG

## 2019-10-27 LAB
ALT SERPL-CCNC: 19 U/L (ref 2–50)
AMPHET UR QL SCN: NEGATIVE
AST SERPL-CCNC: 30 U/L (ref 12–45)
BARBITURATES UR QL SCN: NEGATIVE
BASOPHILS # BLD AUTO: 0.2 % (ref 0–1.8)
BASOPHILS # BLD: 0.02 K/UL (ref 0–0.12)
BENZODIAZ UR QL SCN: NEGATIVE
BZE UR QL SCN: NEGATIVE
CANNABINOIDS UR QL SCN: NEGATIVE
CREAT SERPL-MCNC: 0.52 MG/DL (ref 0.5–1.4)
CREAT UR-MCNC: 17.2 MG/DL
EOSINOPHIL # BLD AUTO: 0.06 K/UL (ref 0–0.51)
EOSINOPHIL NFR BLD: 0.7 % (ref 0–6.9)
ERYTHROCYTE [DISTWIDTH] IN BLOOD BY AUTOMATED COUNT: 46.5 FL (ref 35.9–50)
HCT VFR BLD AUTO: 30.7 % (ref 37–47)
HGB BLD-MCNC: 9.9 G/DL (ref 12–16)
HOLDING TUBE BB 8507: NORMAL
IMM GRANULOCYTES # BLD AUTO: 0.09 K/UL (ref 0–0.11)
IMM GRANULOCYTES NFR BLD AUTO: 1 % (ref 0–0.9)
LYMPHOCYTES # BLD AUTO: 1.71 K/UL (ref 1–4.8)
LYMPHOCYTES NFR BLD: 19.6 % (ref 22–41)
MCH RBC QN AUTO: 30.6 PG (ref 27–33)
MCHC RBC AUTO-ENTMCNC: 32.2 G/DL (ref 33.6–35)
MCV RBC AUTO: 94.8 FL (ref 81.4–97.8)
METHADONE UR QL SCN: NEGATIVE
MONOCYTES # BLD AUTO: 0.77 K/UL (ref 0–0.85)
MONOCYTES NFR BLD AUTO: 8.8 % (ref 0–13.4)
NEUTROPHILS # BLD AUTO: 6.08 K/UL (ref 2–7.15)
NEUTROPHILS NFR BLD: 69.7 % (ref 44–72)
NRBC # BLD AUTO: 0 K/UL
NRBC BLD-RTO: 0 /100 WBC
OPIATES UR QL SCN: NEGATIVE
OXYCODONE UR QL SCN: NEGATIVE
PCP UR QL SCN: NEGATIVE
PLATELET # BLD AUTO: 278 K/UL (ref 164–446)
PMV BLD AUTO: 10 FL (ref 9–12.9)
PROPOXYPH UR QL SCN: NEGATIVE
PROT UR-MCNC: 5.5 MG/DL (ref 0–15)
PROT/CREAT UR: 320 MG/G (ref 10–107)
RBC # BLD AUTO: 3.24 M/UL (ref 4.2–5.4)
WBC # BLD AUTO: 8.7 K/UL (ref 4.8–10.8)

## 2019-10-27 PROCEDURE — 770002 HCHG ROOM/CARE - OB PRIVATE (112)

## 2019-10-27 PROCEDURE — 700105 HCHG RX REV CODE 258: Performed by: OBSTETRICS & GYNECOLOGY

## 2019-10-27 PROCEDURE — 84460 ALANINE AMINO (ALT) (SGPT): CPT

## 2019-10-27 PROCEDURE — 81002 URINALYSIS NONAUTO W/O SCOPE: CPT

## 2019-10-27 PROCEDURE — 80307 DRUG TEST PRSMV CHEM ANLYZR: CPT

## 2019-10-27 PROCEDURE — 84156 ASSAY OF PROTEIN URINE: CPT

## 2019-10-27 PROCEDURE — 82565 ASSAY OF CREATININE: CPT

## 2019-10-27 PROCEDURE — 82570 ASSAY OF URINE CREATININE: CPT

## 2019-10-27 PROCEDURE — 36415 COLL VENOUS BLD VENIPUNCTURE: CPT

## 2019-10-27 PROCEDURE — 3E033VJ INTRODUCTION OF OTHER HORMONE INTO PERIPHERAL VEIN, PERCUTANEOUS APPROACH: ICD-10-PCS | Performed by: OBSTETRICS & GYNECOLOGY

## 2019-10-27 PROCEDURE — 84450 TRANSFERASE (AST) (SGOT): CPT

## 2019-10-27 PROCEDURE — 85025 COMPLETE CBC W/AUTO DIFF WBC: CPT

## 2019-10-27 PROCEDURE — 700111 HCHG RX REV CODE 636 W/ 250 OVERRIDE (IP)

## 2019-10-27 RX ORDER — IBUPROFEN 800 MG/1
800 TABLET ORAL EVERY 8 HOURS PRN
Status: DISCONTINUED | OUTPATIENT
Start: 2019-10-27 | End: 2019-10-29 | Stop reason: HOSPADM

## 2019-10-27 RX ORDER — ONDANSETRON 2 MG/ML
4 INJECTION INTRAMUSCULAR; INTRAVENOUS EVERY 6 HOURS PRN
Status: DISCONTINUED | OUTPATIENT
Start: 2019-10-27 | End: 2019-10-29 | Stop reason: HOSPADM

## 2019-10-27 RX ORDER — ACETAMINOPHEN 500 MG
1000 TABLET ORAL EVERY 6 HOURS PRN
Status: DISCONTINUED | OUTPATIENT
Start: 2019-10-27 | End: 2019-10-29 | Stop reason: HOSPADM

## 2019-10-27 RX ORDER — ONDANSETRON 4 MG/1
4 TABLET, ORALLY DISINTEGRATING ORAL EVERY 6 HOURS PRN
Status: DISCONTINUED | OUTPATIENT
Start: 2019-10-27 | End: 2019-10-29 | Stop reason: HOSPADM

## 2019-10-27 RX ORDER — SODIUM CHLORIDE, SODIUM LACTATE, POTASSIUM CHLORIDE, CALCIUM CHLORIDE 600; 310; 30; 20 MG/100ML; MG/100ML; MG/100ML; MG/100ML
INJECTION, SOLUTION INTRAVENOUS CONTINUOUS
Status: DISPENSED | OUTPATIENT
Start: 2019-10-27 | End: 2019-10-28

## 2019-10-27 RX ADMIN — OXYTOCIN 1 UNITS: 10 INJECTION, SOLUTION INTRAMUSCULAR; INTRAVENOUS at 23:14

## 2019-10-27 RX ADMIN — SODIUM CHLORIDE, POTASSIUM CHLORIDE, SODIUM LACTATE AND CALCIUM CHLORIDE: 600; 310; 30; 20 INJECTION, SOLUTION INTRAVENOUS at 23:15

## 2019-10-27 ASSESSMENT — LIFESTYLE VARIABLES
EVER HAD A DRINK FIRST THING IN THE MORNING TO STEADY YOUR NERVES TO GET RID OF A HANGOVER: NO
CONSUMPTION TOTAL: INCOMPLETE
HAVE YOU EVER FELT YOU SHOULD CUT DOWN ON YOUR DRINKING: NO
TOTAL SCORE: 0
EVER_SMOKED: YES
TOTAL SCORE: 0
ALCOHOL_USE: NO
HAVE PEOPLE ANNOYED YOU BY CRITICIZING YOUR DRINKING: NO
TOTAL SCORE: 0
EVER FELT BAD OR GUILTY ABOUT YOUR DRINKING: NO

## 2019-10-27 ASSESSMENT — PATIENT HEALTH QUESTIONNAIRE - PHQ9
2. FEELING DOWN, DEPRESSED, IRRITABLE, OR HOPELESS: NOT AT ALL
SUM OF ALL RESPONSES TO PHQ9 QUESTIONS 1 AND 2: 0
1. LITTLE INTEREST OR PLEASURE IN DOING THINGS: NOT AT ALL

## 2019-10-27 ASSESSMENT — COPD QUESTIONNAIRES
HAVE YOU SMOKED AT LEAST 100 CIGARETTES IN YOUR ENTIRE LIFE: NO/DON'T KNOW
DO YOU EVER COUGH UP ANY MUCUS OR PHLEGM?: NO/ONLY WITH OCCASIONAL COLDS OR INFECTIONS
IN THE PAST 12 MONTHS DO YOU DO LESS THAN YOU USED TO BECAUSE OF YOUR BREATHING PROBLEMS: DISAGREE/UNSURE
DURING THE PAST 4 WEEKS HOW MUCH DID YOU FEEL SHORT OF BREATH: NONE/LITTLE OF THE TIME

## 2019-10-27 ASSESSMENT — PAIN SCALES - GENERAL: PAINLEVEL: 0 - NO PAIN

## 2019-10-27 NOTE — PROGRESS NOTES
Notified by RN that pt desires to leave prior to me coming to evaluate pt. Came for ctx, not found to be in labor.    Discussed with pt that more than half of her BPs are elevated to mild range while here in triage and because of this I recommend she stay for IOL as per ACOG recommendations at elevated Bps >37wks. Disscussed that I am concerned about the risk of preeclampsia, patient is amenable to having her blood drawn prior to leaving but discussed that even if her labs are normal, I would recommend induction of labor.  Patient prefers to go home right now.  We discussed the risks of preeclampsia and eclampsia including maternal stroke and death, seizures, kidney and liver failure, placental abruption and fetal death, bleeding.  We discussed that the goal of delivering gestational hypertension is delivery prior to the disease process progressing.  Patient expressed understanding.  Still would like to go home right now.  Patient reports she will monitor her blood pressures and return if they are elevated.  I also strictly informed her to return if with headaches/vision changes/right upper quadrant or epigastric pain, nausea vomiting or any other concerns.  Will sign out against medical advice now.    Angeles Ho MD  [facility], Women's Health

## 2019-10-27 NOTE — PROGRESS NOTES
"30yo, , edc11/8, 38.2 presents with c/o UCs last night. Pt denies LOF, vag bleeding. POS fm. EFM and [place] placed. Pt found to have mildly elevated BPs, set to take serially. SVE 2-3/40/-2.   Dr. Mcmahon updated. Will admit for delivery due to elevated BPs.   When RN returned to room, Pt stated she wants to leave. She was informed that we were going to keep her and deliver her. Still states that she wants to go home. Requested that we get more of a tracing on baby but she insists on going. Dr. Mcmahon updated. Will come talk with pt. Pt consented to have her labs done for PIH before leaving.   Dr. Mcmahon in room. Discussed with pt the risks of PIH and reasons for delivering her now. Pt insistent on going home. States she has a home BP cuff and will take her own BP. Attempted to draw labs x2 but was unsuccessful and pt stated that she \"would be back soon anyways.\" Pt signed AMA form and left.   "

## 2019-10-28 ENCOUNTER — ANESTHESIA (OUTPATIENT)
Dept: ANESTHESIOLOGY | Facility: MEDICAL CENTER | Age: 29
End: 2019-10-28
Payer: MEDICAID

## 2019-10-28 ENCOUNTER — ANESTHESIA EVENT (OUTPATIENT)
Dept: ANESTHESIOLOGY | Facility: MEDICAL CENTER | Age: 29
End: 2019-10-28
Payer: MEDICAID

## 2019-10-28 LAB
APPEARANCE UR: CLEAR
COLOR UR AUTO: YELLOW
GLUCOSE BLD-MCNC: 66 MG/DL (ref 65–99)
GLUCOSE UR QL STRIP.AUTO: NEGATIVE MG/DL
KETONES UR QL STRIP.AUTO: NEGATIVE MG/DL
LEUKOCYTE ESTERASE UR QL STRIP.AUTO: ABNORMAL
NITRITE UR QL STRIP.AUTO: NEGATIVE
PH UR STRIP.AUTO: 6.5 [PH] (ref 5–8)
PROT UR QL STRIP: NEGATIVE MG/DL
RBC UR QL AUTO: NEGATIVE
SP GR UR: 1.01 (ref 1–1.03)

## 2019-10-28 PROCEDURE — 59409 OBSTETRICAL CARE: CPT

## 2019-10-28 PROCEDURE — 700101 HCHG RX REV CODE 250: Performed by: ANESTHESIOLOGY

## 2019-10-28 PROCEDURE — 700105 HCHG RX REV CODE 258

## 2019-10-28 PROCEDURE — 700105 HCHG RX REV CODE 258: Performed by: ANESTHESIOLOGY

## 2019-10-28 PROCEDURE — 770002 HCHG ROOM/CARE - OB PRIVATE (112)

## 2019-10-28 PROCEDURE — 700111 HCHG RX REV CODE 636 W/ 250 OVERRIDE (IP)

## 2019-10-28 PROCEDURE — 700111 HCHG RX REV CODE 636 W/ 250 OVERRIDE (IP): Performed by: ANESTHESIOLOGY

## 2019-10-28 PROCEDURE — 304965 HCHG RECOVERY SERVICES

## 2019-10-28 PROCEDURE — A9270 NON-COVERED ITEM OR SERVICE: HCPCS | Performed by: OBSTETRICS & GYNECOLOGY

## 2019-10-28 PROCEDURE — 700111 HCHG RX REV CODE 636 W/ 250 OVERRIDE (IP): Performed by: OBSTETRICS & GYNECOLOGY

## 2019-10-28 PROCEDURE — 82962 GLUCOSE BLOOD TEST: CPT

## 2019-10-28 PROCEDURE — 700105 HCHG RX REV CODE 258: Performed by: FAMILY MEDICINE

## 2019-10-28 PROCEDURE — A9270 NON-COVERED ITEM OR SERVICE: HCPCS | Performed by: FAMILY MEDICINE

## 2019-10-28 PROCEDURE — 700102 HCHG RX REV CODE 250 W/ 637 OVERRIDE(OP): Performed by: FAMILY MEDICINE

## 2019-10-28 PROCEDURE — 700102 HCHG RX REV CODE 250 W/ 637 OVERRIDE(OP): Performed by: OBSTETRICS & GYNECOLOGY

## 2019-10-28 PROCEDURE — 59410 OBSTETRICAL CARE: CPT | Performed by: OBSTETRICS & GYNECOLOGY

## 2019-10-28 RX ORDER — HYDROCODONE BITARTRATE AND ACETAMINOPHEN 5; 325 MG/1; MG/1
1-2 TABLET ORAL EVERY 6 HOURS PRN
Status: DISCONTINUED | OUTPATIENT
Start: 2019-10-28 | End: 2019-10-30 | Stop reason: HOSPADM

## 2019-10-28 RX ORDER — BUPIVACAINE HYDROCHLORIDE 2.5 MG/ML
INJECTION, SOLUTION EPIDURAL; INFILTRATION; INTRACAUDAL PRN
Status: DISCONTINUED | OUTPATIENT
Start: 2019-10-28 | End: 2019-10-28 | Stop reason: SURG

## 2019-10-28 RX ORDER — DEXTROSE, SODIUM CHLORIDE, SODIUM LACTATE, POTASSIUM CHLORIDE, AND CALCIUM CHLORIDE 5; .6; .31; .03; .02 G/100ML; G/100ML; G/100ML; G/100ML; G/100ML
INJECTION, SOLUTION INTRAVENOUS CONTINUOUS
Status: DISCONTINUED | OUTPATIENT
Start: 2019-10-28 | End: 2019-10-30 | Stop reason: HOSPADM

## 2019-10-28 RX ORDER — CALCIUM CARBONATE 500 MG/1
1000 TABLET, CHEWABLE ORAL 2 TIMES DAILY PRN
Status: DISCONTINUED | OUTPATIENT
Start: 2019-10-28 | End: 2019-10-30 | Stop reason: HOSPADM

## 2019-10-28 RX ORDER — SODIUM CHLORIDE, SODIUM LACTATE, POTASSIUM CHLORIDE, AND CALCIUM CHLORIDE .6; .31; .03; .02 G/100ML; G/100ML; G/100ML; G/100ML
1000 INJECTION, SOLUTION INTRAVENOUS ONCE
Status: COMPLETED | OUTPATIENT
Start: 2019-10-28 | End: 2019-10-28

## 2019-10-28 RX ORDER — ROPIVACAINE HYDROCHLORIDE 2 MG/ML
INJECTION, SOLUTION EPIDURAL; INFILTRATION; PERINEURAL
Status: COMPLETED
Start: 2019-10-28 | End: 2019-10-28

## 2019-10-28 RX ORDER — ALUMINA, MAGNESIA, AND SIMETHICONE 2400; 2400; 240 MG/30ML; MG/30ML; MG/30ML
10 SUSPENSION ORAL 4 TIMES DAILY PRN
Status: DISCONTINUED | OUTPATIENT
Start: 2019-10-28 | End: 2019-10-30 | Stop reason: HOSPADM

## 2019-10-28 RX ORDER — ROPIVACAINE HYDROCHLORIDE 2 MG/ML
INJECTION, SOLUTION EPIDURAL; INFILTRATION; PERINEURAL CONTINUOUS
Status: DISCONTINUED | OUTPATIENT
Start: 2019-10-28 | End: 2019-10-30 | Stop reason: HOSPADM

## 2019-10-28 RX ORDER — SODIUM CHLORIDE, SODIUM LACTATE, POTASSIUM CHLORIDE, CALCIUM CHLORIDE 600; 310; 30; 20 MG/100ML; MG/100ML; MG/100ML; MG/100ML
INJECTION, SOLUTION INTRAVENOUS
Status: COMPLETED
Start: 2019-10-28 | End: 2019-10-28

## 2019-10-28 RX ORDER — OXYTOCIN 10 [USP'U]/ML
10 INJECTION, SOLUTION INTRAMUSCULAR; INTRAVENOUS ONCE
Status: COMPLETED | OUTPATIENT
Start: 2019-10-28 | End: 2019-10-28

## 2019-10-28 RX ORDER — ROPIVACAINE HYDROCHLORIDE 2 MG/ML
INJECTION, SOLUTION EPIDURAL; INFILTRATION; PERINEURAL CONTINUOUS
Status: DISCONTINUED | OUTPATIENT
Start: 2019-10-28 | End: 2019-10-28

## 2019-10-28 RX ORDER — SODIUM CHLORIDE, SODIUM LACTATE, POTASSIUM CHLORIDE, AND CALCIUM CHLORIDE .6; .31; .03; .02 G/100ML; G/100ML; G/100ML; G/100ML
1000 INJECTION, SOLUTION INTRAVENOUS
Status: COMPLETED | OUTPATIENT
Start: 2019-10-28 | End: 2019-10-28

## 2019-10-28 RX ORDER — SODIUM CHLORIDE, SODIUM LACTATE, POTASSIUM CHLORIDE, AND CALCIUM CHLORIDE .6; .31; .03; .02 G/100ML; G/100ML; G/100ML; G/100ML
250 INJECTION, SOLUTION INTRAVENOUS PRN
Status: DISCONTINUED | OUTPATIENT
Start: 2019-10-28 | End: 2019-10-29 | Stop reason: HOSPADM

## 2019-10-28 RX ADMIN — OXYTOCIN 10 UNITS: 10 INJECTION, SOLUTION INTRAMUSCULAR; INTRAVENOUS at 22:44

## 2019-10-28 RX ADMIN — ROPIVACAINE HYDROCHLORIDE: 2 INJECTION, SOLUTION EPIDURAL; INFILTRATION; PERINEURAL at 09:24

## 2019-10-28 RX ADMIN — EPHEDRINE SULFATE 10 MG: 50 INJECTION INTRAVENOUS at 10:29

## 2019-10-28 RX ADMIN — ACETAMINOPHEN 1000 MG: 500 TABLET ORAL at 14:23

## 2019-10-28 RX ADMIN — ROPIVACAINE HYDROCHLORIDE: 2 INJECTION, SOLUTION EPIDURAL; INFILTRATION at 20:10

## 2019-10-28 RX ADMIN — ROPIVACAINE HYDROCHLORIDE: 2 INJECTION, SOLUTION EPIDURAL; INFILTRATION; PERINEURAL at 20:10

## 2019-10-28 RX ADMIN — SODIUM CHLORIDE, POTASSIUM CHLORIDE, SODIUM LACTATE AND CALCIUM CHLORIDE 1000 ML: 600; 310; 30; 20 INJECTION, SOLUTION INTRAVENOUS at 10:35

## 2019-10-28 RX ADMIN — FENTANYL CITRATE 100 MCG: 50 INJECTION INTRAMUSCULAR; INTRAVENOUS at 02:06

## 2019-10-28 RX ADMIN — ONDANSETRON 4 MG: 2 INJECTION INTRAMUSCULAR; INTRAVENOUS at 09:35

## 2019-10-28 RX ADMIN — SODIUM CHLORIDE, SODIUM LACTATE, POTASSIUM CHLORIDE, AND CALCIUM CHLORIDE 1000 ML: .6; .31; .03; .02 INJECTION, SOLUTION INTRAVENOUS at 08:45

## 2019-10-28 RX ADMIN — FENTANYL CITRATE 100 MCG: 50 INJECTION, SOLUTION INTRAMUSCULAR; INTRAVENOUS at 09:27

## 2019-10-28 RX ADMIN — BUPIVACAINE HYDROCHLORIDE 8 ML: 2.5 INJECTION, SOLUTION EPIDURAL; INFILTRATION; INTRACAUDAL; PERINEURAL at 09:27

## 2019-10-28 RX ADMIN — SODIUM CHLORIDE, POTASSIUM CHLORIDE, SODIUM LACTATE AND CALCIUM CHLORIDE 1000 ML: 600; 310; 30; 20 INJECTION, SOLUTION INTRAVENOUS at 08:45

## 2019-10-28 RX ADMIN — ROPIVACAINE HYDROCHLORIDE: 2 INJECTION, SOLUTION EPIDURAL; INFILTRATION at 09:24

## 2019-10-28 RX ADMIN — ACETAMINOPHEN 1000 MG: 500 TABLET ORAL at 07:00

## 2019-10-28 RX ADMIN — IBUPROFEN 800 MG: 800 TABLET, FILM COATED ORAL at 22:26

## 2019-10-28 RX ADMIN — FENTANYL CITRATE 100 MCG: 50 INJECTION INTRAMUSCULAR; INTRAVENOUS at 07:26

## 2019-10-28 RX ADMIN — FENTANYL CITRATE 100 MCG: 50 INJECTION INTRAMUSCULAR; INTRAVENOUS at 04:46

## 2019-10-28 RX ADMIN — OXYTOCIN 3 MILLI-UNITS/MIN: 10 INJECTION, SOLUTION INTRAMUSCULAR; INTRAVENOUS at 00:06

## 2019-10-28 RX ADMIN — SODIUM CHLORIDE, SODIUM LACTATE, POTASSIUM CHLORIDE, CALCIUM CHLORIDE AND DEXTROSE MONOHYDRATE: 5; 600; 310; 30; 20 INJECTION, SOLUTION INTRAVENOUS at 15:06

## 2019-10-28 RX ADMIN — SODIUM CHLORIDE, SODIUM LACTATE, POTASSIUM CHLORIDE, AND CALCIUM CHLORIDE 1000 ML: .6; .31; .03; .02 INJECTION, SOLUTION INTRAVENOUS at 10:35

## 2019-10-28 RX ADMIN — SODIUM CHLORIDE, POTASSIUM CHLORIDE, SODIUM LACTATE AND CALCIUM CHLORIDE 250 ML: 600; 310; 30; 20 INJECTION, SOLUTION INTRAVENOUS at 10:06

## 2019-10-28 RX ADMIN — ANTACID TABLETS 1000 MG: 500 TABLET, CHEWABLE ORAL at 17:24

## 2019-10-28 RX ADMIN — HYDROCODONE BITARTRATE AND ACETAMINOPHEN 2 TABLET: 5; 325 TABLET ORAL at 23:22

## 2019-10-28 RX ADMIN — EPHEDRINE SULFATE 5 MG: 50 INJECTION INTRAVENOUS at 10:06

## 2019-10-28 ASSESSMENT — PATIENT HEALTH QUESTIONNAIRE - PHQ9
2. FEELING DOWN, DEPRESSED, IRRITABLE, OR HOPELESS: NOT AT ALL
1. LITTLE INTEREST OR PLEASURE IN DOING THINGS: NOT AT ALL
SUM OF ALL RESPONSES TO PHQ9 QUESTIONS 1 AND 2: 0

## 2019-10-28 ASSESSMENT — PAIN SCALES - GENERAL: PAIN_LEVEL: 3

## 2019-10-28 NOTE — ANESTHESIA PREPROCEDURE EVALUATION
Relevant Problems   NEURO   (+) History of anxiety       Physical Exam    Airway   Mallampati: II  TM distance: >3 FB  Neck ROM: full       Cardiovascular - normal exam  Rhythm: regular  Rate: normal  (-) murmur     Dental - normal exam         Pulmonary - normal exam  Breath sounds clear to auscultation     Abdominal    Neurological - normal exam                 Anesthesia Plan    ASA 2       Plan - epidural   Neuraxial block will be labor analgesia        Induction: intravenous    Postoperative Plan: Postoperative administration of opioids is intended.    Pertinent diagnostic labs and testing reviewed    Informed Consent:    Anesthetic plan and risks discussed with patient.    Use of blood products discussed with: patient whom consented to blood products.

## 2019-10-28 NOTE — PROGRESS NOTES
30yo, , edc11/8, 38.2 presents with c/o elevated BPs at home after leaving AMA. Pt denies LOF, vag bleeding. POS fm. EFM and [place] placed. Serial BP started.    Report to CLAUS Jaeger, at bedside.

## 2019-10-28 NOTE — ANESTHESIA PROCEDURE NOTES
Epidural Block  Date/Time: 10/28/2019 9:27 AM  Performed by: Tobey Gansert, M.D.  Authorized by: Tobey Gansert, M.D.     Patient Location:  OB  Start Time:  10/28/2019 9:27 AM  Reason for Block: labor analgesia    patient identified, IV checked, site marked, risks and benefits discussed, surgical consent, monitors and equipment checked, pre-op evaluation and timeout performed    Patient Position:  Sitting  Prep: ChloraPrep, patient draped and sterile technique    Monitoring:  Blood pressure, continuous pulse oximetry and heart rate  Approach:  Midline  Location:  L4-L5  Injection Technique:  JOSE saline  Skin infiltration:  Lidocaine  Strength:  1%  Dose:  3ml  Needle Type:  Tuohy  Needle Gauge:  17 G  Needle Length:  3.5 in  Loss of resistance::  5  Catheter Size:  19 G  Catheter at Skin Depth:  10  Test Dose:  Lidocaine 1.5% with epinephrine 1-to-200,000

## 2019-10-28 NOTE — H&P
History and Physical      Esmer Perdomo is a 29 y.o. year old female  at 38w2d dated by first trimester ultrasound who presented to triage this morning for contractions. She was found to have high blood pressures up to 143/79 and was advised to be admitted out of concern for pre-eclampsia. She refused to be admitted, however, and left AMA. She was checking her blood pressures at home after she left and she reports they went as high as SBP in the 150s, so she decided to come back in.    Presently she is feeling contractions still along with some pain in her sides. She states that the pain alternates between sides. She denies any upper abdominal pain in the epigastric region or RUQ. She reports that she had a headache last night and this morning but currently does not have any. She denies any vision changes.    PNC with TPC starting at 18 weeks. She admits to smoking about a pack per day up until a month ago. She also reports that she used methamphetamines once during pregnancy early on, and a couple glasses of wine. She denies any complications otherwise.    Subjective:   positive  For CTXS.   positive Feels pain   negative for LOF  negative for vaginal bleeding.   positive for fetal movement    ROS: Patient denies fever, chills, nausea, vomiting , headache, visual disturbance, swelling of hands/face and dysuria.    Past Medical History:   Diagnosis Date   • ASCUS of cervix with negative high risk HPV      Past Surgical History:   Procedure Laterality Date   • EYE SURGERY Right    • OTHER ORTHOPEDIC SURGERY Right     laceration closure     OB History    Para Term  AB Living   5 2 2   2 2   SAB TAB Ectopic Molar Multiple Live Births     2     0 2      # Outcome Date GA Lbr Froy/2nd Weight Sex Delivery Anes PTL Lv   5 Current            4 Term 12/15/15 39w5d  3.365 kg (7 lb 6.7 oz) F Vag-Spont EPI  NATE   3 Term 12 38w0d  3.912 kg (8 lb 10 oz) M Vag-Vacuum   NATE   2 TAB            1  TAB              GYN History:  Menarche at age 12, regular every 28 days, lasting 3 days. Denies history of fibroids, STIs requiring treatment, herpes, and heavy periods  Social History     Socioeconomic History   • Marital status:      Spouse name: Not on file   • Number of children: Not on file   • Years of education: Not on file   • Highest education level: Not on file   Occupational History   • Not on file   Social Needs   • Financial resource strain: Not on file   • Food insecurity:     Worry: Not on file     Inability: Not on file   • Transportation needs:     Medical: Not on file     Non-medical: Not on file   Tobacco Use   • Smoking status: Current Every Day Smoker   • Smokeless tobacco: Never Used   • Tobacco comment: pack at day or every 2 days   Substance and Sexual Activity   • Alcohol use: No   • Drug use: No     Comment: one month ago Meth use around 18 weeks   • Sexual activity: Yes     Partners: Male   Lifestyle   • Physical activity:     Days per week: Not on file     Minutes per session: Not on file   • Stress: Not on file   Relationships   • Social connections:     Talks on phone: Not on file     Gets together: Not on file     Attends Muslim service: Not on file     Active member of club or organization: Not on file     Attends meetings of clubs or organizations: Not on file     Relationship status: Not on file   • Intimate partner violence:     Fear of current or ex partner: Not on file     Emotionally abused: Not on file     Physically abused: Not on file     Forced sexual activity: Not on file   Other Topics Concern   • Not on file   Social History Narrative   • Not on file     Allergies: Patient has no known allergies.  No current facility-administered medications on file prior to encounter.      Current Outpatient Medications on File Prior to Encounter   Medication Sig Dispense Refill   • doxylamine (UNISOM) 25 MG Tab tablet Take 25 mg by mouth every bedtime.     • hydrOXYzine HCl  "(ATARAX) 50 MG Tab Take 1 Tab by mouth at bedtime as needed for Itching. 15 Tab 0   • diphenhydrAMINE (BENADRYL) 12.5 MG/5ML Elixir Take 12.5 mg by mouth 4 times a day as needed.     • acetaminophen (TYLENOL) 500 MG Tab Take 500-1,000 mg by mouth every 6 hours as needed for Moderate Pain.     • Prenatal MV-Min-Fe Fum-FA-DHA (PRENATAL 1 PO) Take  by mouth.           Objective:      /79   Pulse 90   Resp 18   Ht 1.676 m (5' 5.98\")   Wt 82.6 kg (181 lb 15.8 oz)     General: No acute distress, resting comfortably in bed.  HEENT: normocephalic, nontraumatic, EOMI  Cardiovascular: Heart RRR with no murmurs, rubs or gallops. Distal Pulses 2+  Respiratory: symmetric chest expansion, lungs CTAB, with no wheezes, rales, rhonci  Abdomen: gravid, nontender  Musculoskeletal: strength 5/5 in four extremities  Neuro: non focal with no numbness, tingling or changes in sensation  EFW: 3943g per US on 10/24  [place]: Irregular and indeterminate  FHRM: Baseline 135, Accels to160, no decels, moderate variability  Presentation: vertex  Cervix:  3cm/60%/-2      Lab Review  Lab:   Blood type: O    HBsAg: non-reactive   HIV: non-reactive   GC: negative   Chlamydia: negative    Rubella: immune    GBS: negative  1 hr GTT: not done    Recent Labs     09/10/19  1536 09/10/19  1558   ABOGROUP  --  O   RUBELLAIGG 34.20  --    HEPBSAG Negative  --      10/27/19 2034  ESTIMATED GFR   Collected: 10/27/19 1958  Final result    GFR If African American >60 mL/min/1.73 m 2 GFR If Non African American >60 mL/min/1.73 m 2          10/27/19 2034  ASPARTATE AMINO-CHRISTENSEN   Collected: 10/27/19 1958  Final result  Specimen: Blood    AST(SGOT) 30 U/L            10/27/19 2034  ALANINE AMINO-TRANS   Collected: 10/27/19 1958  Final result  Specimen: Blood    ALT(SGPT) 19 U/L            10/27/19 2034  CREATININE   Collected: 10/27/19 1958  Final result  Specimen: Blood    Creatinine 0.52 mg/dL            10/27/19 2030  URINE DRUG SCREEN   Collected: " "10/27/19 1947  Final result  Specimen: Urine    Amphetamines Urine Negative Opiates Negative   Barbiturates Negative Oxycodone Negative   Benzodiazepines Negative Phencyclidine -Pcp Negative   Cocaine Metabolite Negative Propoxyphene Negative   Methadone Negative Cannabinoid Metab Negative <=\"\">          10/27/19 2030  PROTEIN/CREAT RATIO URINE   Collected: 10/27/19 1947  Final result  Specimen: Urine    Total Protein, Urine 5.5 mg/dL Protein Creatinine Ratio 320High  mg/g   Creatinine, Random Urine 17.20 mg/dL <=\"\">            10/27/19 2015  CBC WITH DIFFERENTIAL   Collected: 10/27/19 1958  Final result  Specimen: Blood    WBC 8.7 K/uL Monocytes 8.80 %   RBC 3.24Low  M/uL Eosinophils 0.70 %   Hemoglobin 9.9Low  g/dL Basophils 0.20 %   Hematocrit 30.7Low  % Immature Granulocytes 1.00High  %   MCV 94.8 fL Nucleated RBC 0.00 /100 WBC   MCH 30.6 pg Neutrophils (Absolute) 6.08 K/uL <=\"\">   MCHC 32.2Low  g/dL Lymphs (Absolute) 1.71 K/uL   RDW 46.5 fL Monos (Absolute) 0.77 K/uL   Platelet Count 278 K/uL Eos (Absolute) 0.06 K/uL   MPV 10.0 fL Baso (Absolute) 0.02 K/uL   Neutrophils-Polys 69.70 % Immature Granulocytes (abs) 0.09 K/uL   Lymphocytes 19.60Low  % NRBC (Absolute) 0.00 K/uL          No results for input(s): SODIUM, POTASSIUM, CHLORIDE, CO2, GLUCOSE, BUN, CPKTOTAL in the last 72 hours.        Assessment/Plan:   Esmer Perdomo is a 29 y.o. year old female  at 38w2d dated by first trimester ultrasound who presents to triage with contractions and moderately high blood pressures. She also has a protein to creatinine ratio of 320, which puts her over the threshold for preeclampsia. She does not have any severe features, however. She is multiparous and her cervix is dilated to a 3, and pitocin will be used for augmentation.    - Admit to L&D  - Anticipate vaginal delivery  - IOL with pitocin  - GBS negative, PNL wnl      OB Attending Addendum:    I have seen and examined Ms. Esmer Perdomo " and agree w/ documentation by Dr. Rosado.  Pt is a 29 y.o.yo  @ 38w3d with preE without severe features admitted for IOL.  Pt prefers to wait as long as possible for ROM.  EfW 4000g.  FHTs reassuring  Did not do glucola, A1c 10/24 5.7.  Will get accucheck.          Angeles Ho MD  [facility], Women's Health

## 2019-10-28 NOTE — PROGRESS NOTES
2200-rcvd report from triage RN and assumed care of pt. Pt IOL for increased BP.  2300-pitocin started per orders.  0206-fentanyl given for pain.  0446-fentanyl given for pain.  0700-report given to dayshift RN

## 2019-10-28 NOTE — PROGRESS NOTES
"0700 Report received from SKYLA Harrison RN. Pt c/o headache and requesting epidural. Anesthesia unavailable at this time; MD currently in OR. Pt educated on OR schedule and offered PRN Fentanyl. Pt requested Tylenol for H/A and declined Fentanyl at this time.     0726 Pt reports H/A has improved but medicated for contraction pain.     0952 Pt has \"important court phone call\" and requesting privacy during the one hour phone call. Pt educated that RN will try to accommodate her request but RN will need to enter if FHT are down or if pt's BP is hypertensive or hypotensive. Pt verbalized understanding and will notify RN when phone call is complete.     1015 Dr Gansert notified of BP requiring ephedrine, see MAR. RN paused epidural while interventions taking place for hypotension. Per MD restart epidural at same rate of 10cc/hr.     1029 Dr Gansert notified of BP 89/45. Orders to give 10 mg Ephedrine IV now and 1,000 cc bolus LR.     1045 Pt refused maldonado placement at this time despite education. \"I just feel like crap and want to rest for a little bit before we do that\". Pt educated on s/s of hypotension and current POC to maintain BP's WNL.     1104 IV Pitocin increased now that pt is comfortable with epidural and BP's are maintaining above 90s systolic and asymptomatic.     1131 Okay per Dr Gansert to decrease Ropivacaine basal rate from 10 cc/hr to 7cc/hr d/t pt unable to wiggle toes or lift knees. Rate changed completed.     2511-0834 Report given to Ronald, JAGDEEP Becerril. This RN at another delivery and reassumed pt care at 1300.     1435 Dr walker at bedside to assess pt status.    1900 Report given to DELROY Kaba RN   "

## 2019-10-28 NOTE — PROGRESS NOTES
2035- Pt sitting up eating.     2113- Pt difficult to trace because she is frequently changing positions due to being uncomfortable. Requesting to ambulate in [place] unable to let pt ambulate due increased Bps.     2148- Report called to CLAUS Phelan. Pt transferred to Labor and Delivery for IOL.

## 2019-10-28 NOTE — PROGRESS NOTES
LABOR AND DELIVERY PROGRESS NOTE    PATIENT ID:  NAME:  Esmer Perdomo  MRN:               [id number]  YOB: 1990     29 y.o. female  at 38w3d.    Subjective: No acute events. Good analgesia noted from epidural    Objective:    Vitals:    10/28/19 1400 10/28/19 1415 10/28/19 1500 10/28/19 1515   BP: 129/80 109/55 112/57 113/55   Pulse: (!) 107 (!) 107 96 98   Resp:       Temp:       TempSrc:       SpO2: 93% 93%     Weight:       Height:           SVE: 3-4/70%/-3  [place]: Q3 minutes;   EFM:  Baseline 125   mod Variability   Accels to 155   Decels absent  Pitocin: 17  Pain control: epidural    Assessment: 29 y.o. female  at 38w3d.    Plan:   1. Continue current management  2. Category 1 tracing  3. Anticipate  delivery  4. Patient decline AROM at this point     Manjinder Maria M.D.

## 2019-10-29 LAB
ALBUMIN SERPL BCP-MCNC: 2.7 G/DL (ref 3.2–4.9)
ALBUMIN/GLOB SERPL: 0.9 G/DL
ALP SERPL-CCNC: 199 U/L (ref 30–99)
ALT SERPL-CCNC: 17 U/L (ref 2–50)
ANION GAP SERPL CALC-SCNC: 9 MMOL/L (ref 0–11.9)
AST SERPL-CCNC: 23 U/L (ref 12–45)
BASOPHILS # BLD AUTO: 0.3 % (ref 0–1.8)
BASOPHILS # BLD: 0.04 K/UL (ref 0–0.12)
BILIRUB SERPL-MCNC: 0.6 MG/DL (ref 0.1–1.5)
BUN SERPL-MCNC: 8 MG/DL (ref 8–22)
CALCIUM SERPL-MCNC: 8.2 MG/DL (ref 8.5–10.5)
CHLORIDE SERPL-SCNC: 103 MMOL/L (ref 96–112)
CO2 SERPL-SCNC: 20 MMOL/L (ref 20–33)
CREAT SERPL-MCNC: 0.7 MG/DL (ref 0.5–1.4)
EOSINOPHIL # BLD AUTO: 0.02 K/UL (ref 0–0.51)
EOSINOPHIL NFR BLD: 0.1 % (ref 0–6.9)
ERYTHROCYTE [DISTWIDTH] IN BLOOD BY AUTOMATED COUNT: 47.8 FL (ref 35.9–50)
GLOBULIN SER CALC-MCNC: 3 G/DL (ref 1.9–3.5)
GLUCOSE SERPL-MCNC: 170 MG/DL (ref 65–99)
HCT VFR BLD AUTO: 31.4 % (ref 37–47)
HGB BLD-MCNC: 9.9 G/DL (ref 12–16)
IMM GRANULOCYTES # BLD AUTO: 0.1 K/UL (ref 0–0.11)
IMM GRANULOCYTES NFR BLD AUTO: 0.7 % (ref 0–0.9)
LYMPHOCYTES # BLD AUTO: 0.97 K/UL (ref 1–4.8)
LYMPHOCYTES NFR BLD: 6.9 % (ref 22–41)
MCH RBC QN AUTO: 30.1 PG (ref 27–33)
MCHC RBC AUTO-ENTMCNC: 31.5 G/DL (ref 33.6–35)
MCV RBC AUTO: 95.4 FL (ref 81.4–97.8)
MONOCYTES # BLD AUTO: 1.09 K/UL (ref 0–0.85)
MONOCYTES NFR BLD AUTO: 7.7 % (ref 0–13.4)
NEUTROPHILS # BLD AUTO: 11.88 K/UL (ref 2–7.15)
NEUTROPHILS NFR BLD: 84.3 % (ref 44–72)
NRBC # BLD AUTO: 0 K/UL
NRBC BLD-RTO: 0 /100 WBC
PLATELET # BLD AUTO: 287 K/UL (ref 164–446)
PMV BLD AUTO: 10.1 FL (ref 9–12.9)
POTASSIUM SERPL-SCNC: 3.7 MMOL/L (ref 3.6–5.5)
PROT SERPL-MCNC: 5.7 G/DL (ref 6–8.2)
RBC # BLD AUTO: 3.29 M/UL (ref 4.2–5.4)
SODIUM SERPL-SCNC: 132 MMOL/L (ref 135–145)
URATE SERPL-MCNC: 6.3 MG/DL (ref 1.9–8.2)
WBC # BLD AUTO: 14.1 K/UL (ref 4.8–10.8)

## 2019-10-29 PROCEDURE — A9270 NON-COVERED ITEM OR SERVICE: HCPCS | Performed by: NURSE PRACTITIONER

## 2019-10-29 PROCEDURE — 80053 COMPREHEN METABOLIC PANEL: CPT

## 2019-10-29 PROCEDURE — 700102 HCHG RX REV CODE 250 W/ 637 OVERRIDE(OP): Performed by: NURSE PRACTITIONER

## 2019-10-29 PROCEDURE — 700102 HCHG RX REV CODE 250 W/ 637 OVERRIDE(OP): Performed by: OBSTETRICS & GYNECOLOGY

## 2019-10-29 PROCEDURE — 770002 HCHG ROOM/CARE - OB PRIVATE (112)

## 2019-10-29 PROCEDURE — 85025 COMPLETE CBC W/AUTO DIFF WBC: CPT

## 2019-10-29 PROCEDURE — 36415 COLL VENOUS BLD VENIPUNCTURE: CPT

## 2019-10-29 PROCEDURE — A9270 NON-COVERED ITEM OR SERVICE: HCPCS | Performed by: OBSTETRICS & GYNECOLOGY

## 2019-10-29 PROCEDURE — 84550 ASSAY OF BLOOD/URIC ACID: CPT

## 2019-10-29 PROCEDURE — 303615 HCHG EPIDURAL/SPINAL ANESTHESIA FOR LABOR

## 2019-10-29 RX ORDER — DOCUSATE SODIUM 100 MG/1
100 CAPSULE, LIQUID FILLED ORAL 2 TIMES DAILY PRN
Status: DISCONTINUED | OUTPATIENT
Start: 2019-10-29 | End: 2019-10-30 | Stop reason: HOSPADM

## 2019-10-29 RX ORDER — IBUPROFEN 800 MG/1
800 TABLET ORAL EVERY 6 HOURS PRN
Status: DISCONTINUED | OUTPATIENT
Start: 2019-10-29 | End: 2019-10-29

## 2019-10-29 RX ORDER — BISACODYL 10 MG
10 SUPPOSITORY, RECTAL RECTAL PRN
Status: DISCONTINUED | OUTPATIENT
Start: 2019-10-29 | End: 2019-10-30 | Stop reason: HOSPADM

## 2019-10-29 RX ORDER — VITAMIN A ACETATE, BETA CAROTENE, ASCORBIC ACID, CHOLECALCIFEROL, .ALPHA.-TOCOPHEROL ACETATE, DL-, THIAMINE MONONITRATE, RIBOFLAVIN, NIACINAMIDE, PYRIDOXINE HYDROCHLORIDE, FOLIC ACID, CYANOCOBALAMIN, CALCIUM CARBONATE, FERROUS FUMARATE, ZINC OXIDE, CUPRIC OXIDE 3080; 12; 120; 400; 1; 1.84; 3; 20; 22; 920; 25; 200; 27; 10; 2 [IU]/1; UG/1; MG/1; [IU]/1; MG/1; MG/1; MG/1; MG/1; MG/1; [IU]/1; MG/1; MG/1; MG/1; MG/1; MG/1
1 TABLET, FILM COATED ORAL EVERY MORNING
Status: DISCONTINUED | OUTPATIENT
Start: 2019-10-29 | End: 2019-10-30 | Stop reason: HOSPADM

## 2019-10-29 RX ORDER — IBUPROFEN 600 MG/1
600 TABLET ORAL EVERY 6 HOURS PRN
Status: DISCONTINUED | OUTPATIENT
Start: 2019-10-29 | End: 2019-10-30 | Stop reason: HOSPADM

## 2019-10-29 RX ORDER — SODIUM CHLORIDE, SODIUM LACTATE, POTASSIUM CHLORIDE, CALCIUM CHLORIDE 600; 310; 30; 20 MG/100ML; MG/100ML; MG/100ML; MG/100ML
INJECTION, SOLUTION INTRAVENOUS PRN
Status: DISCONTINUED | OUTPATIENT
Start: 2019-10-29 | End: 2019-10-30 | Stop reason: HOSPADM

## 2019-10-29 RX ORDER — FERROUS SULFATE 325(65) MG
325 TABLET ORAL 2 TIMES DAILY WITH MEALS
Status: DISCONTINUED | OUTPATIENT
Start: 2019-10-29 | End: 2019-10-30 | Stop reason: HOSPADM

## 2019-10-29 RX ADMIN — IBUPROFEN 800 MG: 800 TABLET ORAL at 16:12

## 2019-10-29 RX ADMIN — HYDROCODONE BITARTRATE AND ACETAMINOPHEN 2 TABLET: 5; 325 TABLET ORAL at 17:29

## 2019-10-29 RX ADMIN — FERROUS SULFATE TAB 325 MG (65 MG ELEMENTAL FE) 325 MG: 325 (65 FE) TAB at 11:32

## 2019-10-29 RX ADMIN — VITAMIN A, VITAMIN C, VITAMIN D-3, VITAMIN E, VITAMIN B-1, VITAMIN B-2, NIACIN, VITAMIN B-6, CALCIUM, IRON, ZINC, COPPER 1 TABLET: 4000; 120; 400; 22; 1.84; 3; 20; 10; 1; 12; 200; 27; 25; 2 TABLET ORAL at 06:10

## 2019-10-29 RX ADMIN — IBUPROFEN 600 MG: 600 TABLET ORAL at 23:30

## 2019-10-29 RX ADMIN — IBUPROFEN 800 MG: 800 TABLET ORAL at 10:20

## 2019-10-29 RX ADMIN — HYDROCODONE BITARTRATE AND ACETAMINOPHEN 2 TABLET: 5; 325 TABLET ORAL at 05:52

## 2019-10-29 RX ADMIN — HYDROCODONE BITARTRATE AND ACETAMINOPHEN 2 TABLET: 5; 325 TABLET ORAL at 23:30

## 2019-10-29 RX ADMIN — FERROUS SULFATE TAB 325 MG (65 MG ELEMENTAL FE) 325 MG: 325 (65 FE) TAB at 17:29

## 2019-10-29 RX ADMIN — HYDROCODONE BITARTRATE AND ACETAMINOPHEN 2 TABLET: 5; 325 TABLET ORAL at 11:32

## 2019-10-29 NOTE — ANESTHESIA TIME REPORT
Anesthesia Start and Stop Event Times     Date Time Event    10/28/2019 0907 Ready for Procedure     0924 Anesthesia Start     2157 Anesthesia Stop        Responsible Staff  10/28/19    Name Role Begin End    Tobey Gansert, M.D. Anesth 0924 2157        Preop Diagnosis (Free Text):  Pre-op Diagnosis             Preop Diagnosis (Codes):    Post op Diagnosis  Pain during labor      Premium Reason  A. 3PM - 7AM    Comments:

## 2019-10-29 NOTE — DISCHARGE PLANNING
Discharge Planning Assessment Post Partum    Reason for Referral: History of drug use and anxiety  Address: [facility] [address] [place] [address]  Phone: [phone]  Type of Living Situation: living in an apartment with her father-Yaima Mckeon  Mom Diagnosis: Pregnancy  Baby Diagnosis:   Primary Language: English    Name of Baby: Ronaldo Perdomo (: 10/28/19)  Father of the Baby: Not involved, did not want to provide his name  Involved in baby’s care? No  Contact Information: N/A    Prenatal Care: Yes  Mom's PCP: None  PCP for new baby: Dr. Gonsales    Support System: Maternal Grandfather-Yaima Mckeon  Coping/Bonding between mother & baby: Yes  Source of Feeding: breast and bottle feeding  Supplies for Infant: 3-in-1 travel system (car seat, stroller, and bassinet), blankets, swaddles, some clothes, and diapers    Mom's Insurance: Medicaid  Baby Covered on Insurance:Yes  Mother Employed/School: [employer]  Other children in the home/names & ages: 7 year old son-James Perdomo and 3 year old daughter-Madhuri Perdomo that live in [place] with their Maternal Grandmother.    Financial Hardship/Income: limited income   Mom's Mental status: alert and oriented  Services used prior to admit: Medicaid, food stamps, and WIC    CPS History: denies  Psychiatric History: history of anxiety.  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  Denies using any drugs or alcohol since then.  MOB and infant's UDS are both negative.    Resources Provided: children and family resource list, counseling resources for post partum depression, applications to [employer] and [employer] for childcare, baby bundle of  baby supplies provided  Referrals Made: diaper bank referral provided and an [employer] referral was made and emailed to  Roselyn Bryson at [email or web address] with the [facility]     Clearance for Discharge: Infant is cleared to discharge home with MOB.

## 2019-10-29 NOTE — CARE PLAN
Problem: Safety  Goal: Will remain free from injury  Note:   Call light within reach, treaded socks in place, bed in lowest position and locked.  Hourly rounding in progress.       Problem: Pain Management  Goal: Pain level will decrease to patient's comfort goal  Note:   Will medicate per MAR.

## 2019-10-29 NOTE — PROGRESS NOTES
Post Partum Progress Note    Name:   Esmer Perdomo   Date/Time:  10/29/2019 - 8:26 AM  Chief Admitting Dx:  Pregnancy  Gestational hypertension  Delivery Type:  vaginal, spontaneous  Post-Op/Post Partum Days #:  1    Subjective:  Abdominal pain: no  Ambulating:   yes  Tolerating liquids:  yes  Tolerating food:  yes common adult  Flatus:   yes  BM:    no  Bleeding:   without any bleeding  Voiding:   yes  Dizziness:   no  Feeding:   both breast and bottle - Similac with iron    Vitals:    10/29/19 0025 10/29/19 0100 10/29/19 0130 10/29/19 0600   BP: 133/78 147/70 127/78 145/85   Pulse: (!) 118 100 98 77   Resp:   16 17   Temp:   36.8 °C (98.2 °F) 36.2 °C (97.1 °F)   TempSrc:   Temporal Temporal   SpO2:   95% 95%   Weight:       Height:           Exam:  Breast: Tenderness no  Abdomen: Abdomen soft, non-tender. BS normal. No masses,  No organomegaly  Fundal Tenderness:  no  Fundus Firm: yes  Incision: none  Below umbilicus: yes  Perineum: perineum intact  Lochia: mild  Extremities: Normal extremities, peripheral pulses and reflexes normal    Meds:  Current Facility-Administered Medications   Medication Dose   • LR infusion     • PRN oxytocin (PITOCIN) (20 Units/1000 mL) PRN for excessive uterine bleeding - See Admin Instr  125-999 mL/hr   • docusate sodium (COLACE) capsule 100 mg  100 mg   • bisacodyl (DULCOLAX) suppository 10 mg  10 mg   • prenatal plus vitamin (STUARTNATAL 1+1) 27-1 MG tablet 1 Tab  1 Tab   • ibuprofen (MOTRIN) tablet 800 mg  800 mg   • ferrous sulfate tablet 325 mg  325 mg   • ropivacaine (NAROPIN) injection     • D5LR infusion     • calcium carbonate (TUMS) chewable tab 1,000 mg  1,000 mg   • mag hydrox-al hydrox-simeth (MAALOX PLUS ES or MYLANTA DS) suspension 10 mL  10 mL   • HYDROcodone-acetaminophen (NORCO) 5-325 MG per tablet 1-2 Tab  1-2 Tab   • oxytocin (PITOCIN) 20 UNITS/1000ML LR (induction of labor)  0.5-20 dakota-units/min       Labs:   Recent Labs     10/27/19  1958  10/29/19  0010   WBC 8.7 14.1*   RBC 3.24* 3.29*   HEMOGLOBIN 9.9* 9.9*   HEMATOCRIT 30.7* 31.4*   MCV 94.8 95.4   MCH 30.6 30.1   MCHC 32.2* 31.5*   RDW 46.5 47.8   PLATELETCT 278 287   MPV 10.0 10.1       Assessment:  Chief Admitting Dx:  Pregnancy  Gestational hypertension  Delivery Type:  vaginal, spontaneous  Tubal Ligation:  no    Plan:  Continue routine post partum care.  Need Social Service consult for maternal/infant needs  Encouraged to ambulate, hydrate  Pain management support needed  Late delivery last night  Anticipate discharge on PPD#2    MELANIA Hernandez.

## 2019-10-29 NOTE — PROGRESS NOTES
Vernon RN brought pt from labor and delivery. ID bands and cuddles checked and verified with labor and delivery nurse, Vernon. Patient oriented to room and surroundings. Skylight discussed. Bulb syring demonstration. Educated on emergency call light. ID bands and security issues discussed. Educated about the pink photo ID name badges. Educated about not sleeping with infant, no carrying infant in halls, and no leaving infant unattended. Assessment completed on patient. Discussed pain management. IV without redness and swelling. Family at bedside. Patient was assessed to restroom, pt able to ambulate safely. Encourage pt to call for any needs.

## 2019-10-29 NOTE — L&D DELIVERY NOTE
Delivery Note    Pre-op diagnosis:   1. IUP at 38w3d  2. Pre-E without severe features    Post operative diagnosis:   1. Same as above  2. Delivered    Indication for delivery: pre-e without severe features    Delivery Course:   Patient progressed to complete and starting pushing. Category 1 FHT while pushing. On final two pushes baby's FHT dropped to 80s. Patient was encouraged to continue to push in the absence of contractions for delivery. For this reason, cord gasses were obtained following delivery.   Viable male  delivered over an intact perineum with one nuchal cord reduced without difficulty delivered at 0957 hour in the vertex INDIGO position with Apgars 7 & 8 with weight 4055 gms.     Anesthesia: epidural    EBL: 250 cc    Lacerations: none    Specimen: cord blood     Complications: none    Condition: mother and baby tolerated procedure well     [facility]

## 2019-10-29 NOTE — PROGRESS NOTES
Assumed pt care at 0715.  Received report from night RN.  Assessment completed.  Pt AAOx4.  Pt complains of pain.  will medicated when appropriate time.  Call light within reach and staff numbers provided.  Pt needs met at this time.

## 2019-10-29 NOTE — PROGRESS NOTES
- report received from MILAGRO Dominguez RN. Pt resting comfortably in bed. Mild c/o lower right sided pain. POc discussed all questions answered.   - Dr. Rodriguez at bedside for POC discussion,   12 hour chart check completed. Prenatal reviewed  - pt reports increased pressure. SVE AL/0. Does not have strong urge to push. Position changed, room prepped for delivery.   2100- SVE complete. Starting to push   2157-  of viable male infant 7/8 APGARS.   2212- SD of intact placenta. discarded   0- IV infiltrated. 3/4 pitocin in. Orders for IM pit   2350- BP slightly elevated. Call to Dr. Rodriguez, orders to repeat [facility] labs and re-evaluate  0120- per Dr. Rodriguez. Labs ok. BP parameters changed to 160/110. Pt up to void, renetta care completed. Pad and brief applied. Pt transported to PPU via wheelchair with infant in stable condition

## 2019-10-29 NOTE — CARE PLAN
Problem: Pain  Goal: Alleviation of Pain or a reduction in pain to the patient's comfort goal  Outcome: PROGRESSING AS EXPECTED  Pt reports adequate pain relief with epidural. Will cont to monitor     Problem: Risk for Infection, Impaired Wound Healing  Goal: Remain free from signs and symptoms of infection  Outcome: PROGRESSING AS EXPECTED   Pt remains afebrile and free from s/s of infection. Will cont to monitor

## 2019-10-29 NOTE — ANESTHESIA POSTPROCEDURE EVALUATION
Patient: Esmer Perdomo    Procedure Summary     Date:  10/28/19 Room / Location:      Anesthesia Start:  0924 Anesthesia Stop:  2157    Procedure:  Labor Epidural Diagnosis:      Scheduled Providers:   Responsible Provider:  Tobey Gansert, M.D.    Anesthesia Type:  epidural ASA Status:  2          Final Anesthesia Type: epidural  Last vitals  BP   Blood Pressure: 113/56    Temp   36.3 °C (97.3 °F)    Pulse   Pulse: 78   Resp   18    SpO2   93 %      Anesthesia Post Evaluation    Patient location during evaluation: PACU  Patient participation: complete - patient participated  Level of consciousness: awake and alert  Pain score: 3    Airway patency: patent  Anesthetic complications: no  Cardiovascular status: hemodynamically stable  Respiratory status: acceptable  Hydration status: euvolemic    PONV: none

## 2019-10-30 VITALS
RESPIRATION RATE: 18 BRPM | SYSTOLIC BLOOD PRESSURE: 131 MMHG | HEART RATE: 88 BPM | TEMPERATURE: 97.2 F | BODY MASS INDEX: 29.25 KG/M2 | WEIGHT: 181.99 LBS | HEIGHT: 66 IN | DIASTOLIC BLOOD PRESSURE: 89 MMHG | OXYGEN SATURATION: 96 %

## 2019-10-30 PROCEDURE — A9270 NON-COVERED ITEM OR SERVICE: HCPCS | Performed by: OBSTETRICS & GYNECOLOGY

## 2019-10-30 PROCEDURE — A9270 NON-COVERED ITEM OR SERVICE: HCPCS | Performed by: NURSE PRACTITIONER

## 2019-10-30 PROCEDURE — 700102 HCHG RX REV CODE 250 W/ 637 OVERRIDE(OP): Performed by: OBSTETRICS & GYNECOLOGY

## 2019-10-30 PROCEDURE — 700102 HCHG RX REV CODE 250 W/ 637 OVERRIDE(OP): Performed by: NURSE PRACTITIONER

## 2019-10-30 RX ORDER — PSEUDOEPHEDRINE HCL 30 MG
100 TABLET ORAL 2 TIMES DAILY PRN
Qty: 60 CAP | Refills: 0 | Status: SHIPPED | OUTPATIENT
Start: 2019-10-30 | End: 2021-01-02

## 2019-10-30 RX ORDER — IBUPROFEN 600 MG/1
600 TABLET ORAL EVERY 6 HOURS PRN
Qty: 30 TAB | Refills: 0 | Status: SHIPPED | OUTPATIENT
Start: 2019-10-30 | End: 2021-01-02

## 2019-10-30 RX ORDER — FERROUS SULFATE 325(65) MG
325 TABLET ORAL 2 TIMES DAILY WITH MEALS
Qty: 30 TAB | Refills: 0 | Status: SHIPPED | OUTPATIENT
Start: 2019-10-30 | End: 2021-01-02

## 2019-10-30 RX ADMIN — FERROUS SULFATE TAB 325 MG (65 MG ELEMENTAL FE) 325 MG: 325 (65 FE) TAB at 11:17

## 2019-10-30 RX ADMIN — VITAMIN A, VITAMIN C, VITAMIN D-3, VITAMIN E, VITAMIN B-1, VITAMIN B-2, NIACIN, VITAMIN B-6, CALCIUM, IRON, ZINC, COPPER 1 TABLET: 4000; 120; 400; 22; 1.84; 3; 20; 10; 1; 12; 200; 27; 25; 2 TABLET ORAL at 05:40

## 2019-10-30 RX ADMIN — HYDROCODONE BITARTRATE AND ACETAMINOPHEN 2 TABLET: 5; 325 TABLET ORAL at 05:40

## 2019-10-30 RX ADMIN — IBUPROFEN 600 MG: 600 TABLET ORAL at 05:40

## 2019-10-30 RX ADMIN — HYDROCODONE BITARTRATE AND ACETAMINOPHEN 2 TABLET: 5; 325 TABLET ORAL at 11:17

## 2019-10-30 RX ADMIN — IBUPROFEN 600 MG: 600 TABLET ORAL at 11:17

## 2019-10-30 NOTE — CONSULTS
Met with MOB for an initial lactation visit.  MOB delivered her fourth baby yesterday, 10/28/19, at 2157 at 38.3 weeks gestation.  Infant is approximately 17.5 hours old.  MOB stated her plan is to feed infant both breast and formula while in the hospital and then formula only when she returns to work which is in approximately two weeks.  MOB stated she breast fed one of her previous three children for approximately 6 weeks until she had to go back to work.  MOB denied having a history of low milk supply.      Assisted MOB with positioning infant at the right breast in the cross cradle position.  Infant latched deep onto the breast immediately and MOB denied pain with latch.  Demonstrated to MOB on how to position her hands on infant's body and her breast as well as how to wedge her breast for deeper latch.  Encouraged MOB to hand express colostrum onto infant's lips to help keep infant's attention when feeding at the breast. See Lactation Assessment Flow Sheet under infant's chart for latch score and assessment.    Encouraged MOB to do as much skin to skin with infant as much as possible.    Encouraged MOB to offer both breasts at each feeding.    Discussed tummy size of infant with MOB.  MOB WAS provided with a copy of the 10-20-30 supplementation guidelines along with instructions on use.    Discussed the effect of supply and demand on milk production.    Discussed what to expect with breastfeeding in the first 24-48-72 hours following delivery as well as signs of successful milk transfer.    NINA has WIC and is seen at the office on [address].  MOB informed of the outpatient lactation assistance available to her through [place].    Breastfeeding Plan:  Offer infant the breast first at every feed.  Then, follow up with formula per the 10-20-30 supplementation guidelines, if infant still appears hungry.     MOB has stated she plans on feeding infant both breast milk and formula while in the hospital and she is aware  of the importance of providing stimulation to the breasts every 3-4 hours to maintain milk supply.    MOB verbalized understanding of all information provided to her and denied having any further questions at this time.  Encouraged MOB to call for lactation assistance as needed.

## 2019-10-30 NOTE — DISCHARGE SUMMARY
Discharge Summary:      Esmer Perdomo    Admit Date:   10/27/2019  Discharge Date:  10/30/2019     Admitting diagnosis:  Pregnancy  Gestational hypertension  Discharge Diagnosis: Status post vaginal, spontaneous.  Pregnancy Complications: none  Tubal Ligation:  no        History:  Past Medical History:   Diagnosis Date   • ASCUS of cervix with negative high risk HPV      OB History    Para Term  AB Living   5 3 3   2 3   SAB TAB Ectopic Molar Multiple Live Births     2     0 3      # Outcome Date GA Lbr Froy/2nd Weight Sex Delivery Anes PTL Lv   5 Term 10/28/19 38w3d  4.055 kg (8 lb 15 oz) M Vag-Spont EPI N NATE   4 Term 12/15/15 39w5d  3.365 kg (7 lb 6.7 oz) F Vag-Spont EPI  NATE   3 Term 12 38w0d  3.912 kg (8 lb 10 oz) M Vag-Vacuum   NATE   2 TAB            1 TAB                 Patient has no known allergies.  Patient Active Problem List    Diagnosis Date Noted   • Supervision of other normal pregnancy 2019   • ASCUS of cervix with negative high risk HPV 2019   • History of anxiety 2019   • Tobacco use affecting pregnancy 2019        Hospital Course:   29 y.o. , now para 3, was admitted with the above mentioned diagnosis, underwent Active Labor, vaginal, spontaneous. Patient postpartum course was unremarkable, with progressive advancement in diet , ambulation and toleration of oral analgesia. Patient without complaints today and desires discharge.      Vitals:    10/29/19 0130 10/29/19 0600 10/29/19 1800 10/30/19 0600   BP: 127/78 145/85 129/77 131/89   Pulse: 98 77 68 88   Resp: 16 17 18 18   Temp: 36.8 °C (98.2 °F) 36.2 °C (97.1 °F) 36.6 °C (97.9 °F) 36.2 °C (97.2 °F)   TempSrc: Temporal Temporal Temporal Temporal   SpO2: 95% 95% 98% 96%   Weight:       Height:           Current Facility-Administered Medications   Medication Dose   • LR infusion     • PRN oxytocin (PITOCIN) (20 Units/1000 mL) PRN for excessive uterine bleeding - See Admin Instr  125-999  mL/hr   • docusate sodium (COLACE) capsule 100 mg  100 mg   • bisacodyl (DULCOLAX) suppository 10 mg  10 mg   • prenatal plus vitamin (STUARTNATAL 1+1) 27-1 MG tablet 1 Tab  1 Tab   • ferrous sulfate tablet 325 mg  325 mg   • ibuprofen (MOTRIN) tablet 600 mg  600 mg   • ropivacaine (NAROPIN) injection     • D5LR infusion     • calcium carbonate (TUMS) chewable tab 1,000 mg  1,000 mg   • mag hydrox-al hydrox-simeth (MAALOX PLUS ES or MYLANTA DS) suspension 10 mL  10 mL   • HYDROcodone-acetaminophen (NORCO) 5-325 MG per tablet 1-2 Tab  1-2 Tab   • oxytocin (PITOCIN) 20 UNITS/1000ML LR (induction of labor)  0.5-20 dakota-units/min       Exam:  Breast Exam: negative  Abdomen: Abdomen soft, non-tender. BS normal. No masses,  No organomegaly  Fundus Non Tender: yes  Incision: none  Perineum: perineum intact  Extremity: extremities, peripheral pulses and reflexes normal     Labs:  Recent Labs     10/27/19  1958 10/29/19  0010   WBC 8.7 14.1*   RBC 3.24* 3.29*   HEMOGLOBIN 9.9* 9.9*   HEMATOCRIT 30.7* 31.4*   MCV 94.8 95.4   MCH 30.6 30.1   MCHC 32.2* 31.5*   RDW 46.5 47.8   PLATELETCT 278 287   MPV 10.0 10.1        Activity:   Discharge to home  Pelvic Rest x 6 weeks    Assessment:  normal postpartum course  Discharge Assessment: No areas of skin breakdown/redness; surgical incision intact/healing     Follow up: [facility] or [facility] in 5 weeks for vaginal ; 1 week for incision check.      Discharge Meds:   Current Outpatient Medications   Medication Sig Dispense Refill   • docusate sodium 100 MG Cap Take 100 mg by mouth 2 times a day as needed for Constipation. 60 Cap 0   • ferrous sulfate 325 (65 Fe) MG tablet Take 1 Tab by mouth 2 times a day, with meals. 30 Tab 0   • ibuprofen (MOTRIN) 600 MG Tab Take 1 Tab by mouth every 6 hours as needed for Mild Pain or Moderate Pain. 30 Tab 0       JERRY Vargas.

## 2019-10-30 NOTE — PROGRESS NOTES
0715 assumed care. Bedside report from Helen DEVLIN. Awake, resting in bed and in no distress. Bed in low position, call light w/in reach. Infant bundled in open crib and in no distress.

## 2019-10-30 NOTE — PROGRESS NOTES
"Discharge instructions initiated. InJoy wendy installed in pt's cell phone. Extra formula and diapers/wipes given to pt. Per pt \"doesn't have much supplies at home yet\". Pt seen by JUANI yesterday, infos/resources given, cleared for DC home. Pt states that her father will be helping her. Pt states that she will be driving herself and baby home. SW notified of current dc plan of pt.   "

## 2019-10-30 NOTE — NON-PROVIDER
CC: s/p     HPI:  30 y/o , now para 3 s/p  10/28/19. Pain currently controlled and minimal vaginal bleeding. Patient denies dizziness with walking. Discussed iron levels. Reports adequate bowel and bladder function. Patient is currently bottle feeding with Similac, but is continuing to work on breastfeeding. Tolerating fluids and solids. No current complaints. All questions answered.     ROS:   General: No f/c  ABD: No abd pain, n/v/d, constipation  : +Minimal vaginal bleeding. No dysuria.  MSK: +BLE edema    PE:  General: Well appearing  ABD: Soft, non-tender to palpation. No masses.  : No fundal tenderness. Fundus firm.   MSK: Minimal BLE edema.    1. Postpartum follow up  - 10/28  -Discussed need to continue prenatals with breastfeeding  -Understands iron levels decreased. Agrees to use Fe supplementation  -Stool softner prn constipation  -IBU prn cramps  -Patient aware not to use tampons, refrain from intercourse, and no douching until follow up in 5 weeks  -Encouraged to ambulate and hydrate    Follow up with the pregnancy center in 5 weeks      Mere JUARES (walton)S

## 2019-10-30 NOTE — LACTATION NOTE
Met with MOB for a lactation follow up visit.  MOB reported she continues to feed infant both breast milk and formula.  MOB stated infant is latching onto the breast without difficulty, but she stated she is not sure infant is receiving enough breast milk.  MOB denied pain and tissue damage to nipples and areolas with latch.  MOB stated infant appears hungry following feeds and she stated she is increasing the amount of formula he is getting to help make him full.    Discussed the stages of milk production with MOB and the time frame in which she could expect the transitional milk to come in.    Reminded MOB to offer infant both breasts at each feeding.    Again, discussed nutritive suck vs non-nutritive suck with MOB and encouraged MOB to allow infant to suckle at the breast for comfort.    Re-educated MOB on how to perform hand expression at both breasts and colostrum easily produced with minimal effort by this LC.  MOB stated she was concerned when she performed hand expression independently because she did not receive any drops from either breasts.  Observed MOB perform hand expression at the right breast and technique used was not correct.  MOB stated she felt relieved when she saw colostrum come from the breasts when this LC performed hand expression.    Again, discussed tummy size of infant with MOB and why following the supplementation guidelines will help keep infant's tummy from overstretching and decrease further digestive risks associated with over feeding.    Offered to have latch observed at the next feeding, but MOB declined.  MOB stated she knows infant is latching correctly onto the breast and is able to distinguish a nutritive suck vs a non-nutritive suck.    Breastfeeding plan remains unchanged and will continue at home.  MOB was told to continue to offer infant the breast first and then provide infant formula after breastfeeding per the 10-20-30 supplementation guidelines, if infant still appears  maryann.    MOB encouraged to follow up with WIC with any lactation questions and/or concerns that arise post discharge.

## 2019-10-30 NOTE — CARE PLAN
Problem: Altered physiologic condition related to immediate post-delivery state and potential for bleeding/hemorrhage  Goal: Patient physiologically stable as evidenced by normal lochia, palpable uterine involution and vital signs within normal limits  Outcome: PROGRESSING AS EXPECTED  Note:   Lochia light, fundus firm, vital signs WNL     Problem: Alteration in comfort related to episiotomy, vaginal repair and/or after birth pains  Goal: Patient verbalizes acceptable pain level  Outcome: PROGRESSING AS EXPECTED  Note:   Patient will request pain medication as needed

## 2019-10-30 NOTE — DISCHARGE INSTRUCTIONS
POSTPARTUM DISCHARGE INSTRUCTIONS FOR MOM    Pelvic rest for 6 weeks   Ambulate   Encourage breastfeeding     YOB: 1990   Age: 29 y.o.               Admit Date: 10/27/2019     Discharge Date: 10/30/2019  Attending Doctor:  MILAGRO Jon*                  Allergies:  Patient has no known allergies.    Discharged to home by car. Discharged via wheelchair, hospital escort: Yes.  Special equipment needed: Not Applicable  Belongings with: Personal  Be sure to schedule a follow-up appointment with your primary care doctor or any specialists as instructed.     Discharge Plan:   Diet Plan: Discussed  Activity Level: Discussed  Smoking Cessation Offered: Patient Counseled  Confirmed Follow up Appointment: Patient to Call and Schedule Appointment  Confirmed Symptoms Management: Discussed  Medication Reconciliation Updated: Yes  Influenza Vaccine Indication: Patient Refuses    REASONS TO CALL YOUR OBSTETRICIAN:  1.   Persistent fever or shaking chills (Temperature higher than 100.4)  2.   Heavy bleeding (soaking more than 1 pad per hour); Passing clots  3.   Foul odor from vagina  4.   Mastitis (Breast infection; breast pain, chills, fever, redness)  5.   Urinary pain, burning or frequency  6.   Episiotomy infection  7.   Abdominal incision infection  8.   Severe depression longer than 24 hours    HAND WASHING  · Prior to handling the baby.  · Before breastfeeding or bottle feeding baby.  · After using the bathroom or changing the baby's diaper.    WOUND CARE  Ask your physician for additional care instructions.  In general:    ·  Incision:      · Keep clean and dry.    · Do NOT lift anything heavier than your baby for up to 6 weeks.    · There should not be any opening or pus.      VAGINAL CARE  · Nothing inside vagina for 6 weeks: no sexual intercourse, tampons or douching.  · Bleeding may continue for 2-4 weeks.  Amount may vary.    · Call your physician for heavy bleeding which means soaking  "more than 1 pad per hour    BIRTH CONTROL  · It is possible to become pregnant at any time after delivery and while breastfeeding.  · Plan to discuss a method of birth control with your physician at your follow up visit. visit.    DIET AND ELIMINATION  · Eating more fiber (bran cereal, fruits, and vegetables) and drinking plenty of fluids will help to avoid constipation.  · Urinary frequency after childbirth is normal.    POSTPARTUM BLUES  During the first few days after birth, you may experience a sense of the \"blues\" which may include impatience, irritability or even crying.  These feeling come and go quickly.  However, as many as 1 in 10 women experience emotional symptoms known as postpartum depression.    Postpartum depression:  May start as early as the second or third day after delivery or take several weeks or months to develop.  Symptoms of \"blues\" are present, but are more intense:  Crying spells; loss of appetite; feelings of hopelessness or loss of control; fear of touching the baby; over concern or no concern at all about the baby; little or no concern about your own appearance/caring for yourself; and/or inability to sleep or excessive sleeping.  Contact your physician if you are experiencing any of these symptoms.    Crisis Hotline:  · [place] Crisis Hotline:  8-802-WYLNYFG  Or 1-127.741.4636  · [place] Crisis Hotline:  1-697.445.8657  Or 716-092-0467    PREVENTING SHAKEN BABY:  If you are angry or stressed, PUT THE BABY IN THE CRIB, step away, take some deep breaths, and wait until you are calm to care for the baby.  DO NOT SHAKE THE BABY.  You are not alone, call a supporter for help.    · Crisis Call Center 24/7 crisis line 716-713-5660 or 1-272.680.4853  · You can also text them, text \"ANSWER\" to 081802    QUIT SMOKING/TOBACCO USE:  I understand the use of any tobacco products increases my chance of suffering from future heart disease and could cause other illnesses which may shorten my life. " Quitting the use of tobacco products is the single most important thing I can do to improve my health. For further information on smoking / tobacco cessation call a Toll Free Quit Line at 1-807.181.9157 (*[employer] [place]) or 1-406.602.2877 (American Lung Association) or you can access the web based program at www.lungusa.org.    · [place] Tobacco Users Help Line:  (545) 528-3787       Toll Free: 1-584.178.4562  · Quit Tobacco Program [facility] (775)881-1549    DEPRESSION / SUICIDE RISK:  As you are discharged from this [facility], it is important to learn how to keep safe from harming yourself.    Recognize the warning signs:  · Abrupt changes in personality, positive or negative- including increase in energy   · Giving away possessions  · Change in eating patterns- significant weight changes-  positive or negative  · Change in sleeping patterns- unable to sleep or sleeping all the time   · Unwillingness or inability to communicate  · Depression  · Unusual sadness, discouragement and loneliness  · Talk of wanting to die  · Neglect of personal appearance   · Rebelliousness- reckless behavior  · Withdrawal from people/activities they love  · Confusion- inability to concentrate     If you or a loved one observes any of these behaviors or has concerns about self-harm, here's what you can do:  · Talk about it- your feelings and reasons for harming yourself  · Remove any means that you might use to hurt yourself (examples: pills, rope, extension cords, firearm)  · Get professional help from the community (Mental Health, Substance Abuse, psychological counseling)  · Do not be alone:Call your Safe Contact- someone whom you trust who will be there for you.  · Call your local CRISIS HOTLINE 627-9425 or 061-003-8289  · Call your local Children's Mobile Crisis Response Team [place] (592) 529-1689 or www.[employer]  · Call the toll free National Suicide Prevention Hotlines    · National Suicide Prevention Lifeline 570-980-GHCP (6748)  · National Hope Line Network 800-SUICIDE (606-1381)    DISCHARGE SURVEY:  Thank you for choosing [facility].  We hope we provided you with very good care.  You may be receiving a survey in the mail.  Please fill it out.  Your opinion is valuable to us.    ADDITIONAL EDUCATIONAL MATERIALS GIVEN TO PATIENT:        My signature on this form indicates that:  1.  I have reviewed and understand the above information  2.  My questions regarding this information have been answered to my satisfaction.  3.  I have formulated a plan with my discharge nurse to obtain my prescribed medication for home.

## 2019-11-18 ENCOUNTER — TELEPHONE (OUTPATIENT)
Dept: OBGYN | Facility: CLINIC | Age: 29
End: 2019-11-18

## 2019-11-18 NOTE — TELEPHONE ENCOUNTER
Pt called requesting work release, pt delivered 10/28/19 and has PP visit scheduled for 12/2/19. Adv pt she needs to be seen before she can be released. Pt understood and appt rescheduled to 11/19/19. Pt agreed and had no further questions or concerns

## 2019-12-05 ENCOUNTER — TELEPHONE (OUTPATIENT)
Dept: OBGYN | Facility: CLINIC | Age: 29
End: 2019-12-05

## 2020-06-15 NOTE — TELEPHONE ENCOUNTER
Pt called in clinic requesting a release of work.  Advised to came to her pp to discuss IUD w/provider.  Letter provided.    Verbalized understanding     Normal vision: sees adequately in most situations; can see medication labels, newsprint

## 2020-07-13 ENCOUNTER — TELEPHONE (OUTPATIENT)
Dept: OBGYN | Facility: CLINIC | Age: 30
End: 2020-07-13

## 2020-09-13 ENCOUNTER — PATIENT MESSAGE (OUTPATIENT)
Dept: OBGYN | Facility: CLINIC | Age: 30
End: 2020-09-13

## 2020-09-15 NOTE — PROGRESS NOTES
"LMP: 09/04/20  Periods are normally for 3 days. Period was not heavy.   Had intercourse unprotected around 09/09/20 and thought she might have been pregnant due to cramping. Took \"morning after pill\" on 09/12/20    Patient interested in birth control, specifically IUD.   "

## 2020-09-17 ENCOUNTER — HOSPITAL ENCOUNTER (OUTPATIENT)
Facility: MEDICAL CENTER | Age: 30
End: 2020-09-17
Attending: OBSTETRICS & GYNECOLOGY
Payer: MEDICAID

## 2020-09-17 ENCOUNTER — TELEPHONE (OUTPATIENT)
Dept: OBGYN | Facility: CLINIC | Age: 30
End: 2020-09-17

## 2020-09-17 ENCOUNTER — GYNECOLOGY VISIT (OUTPATIENT)
Dept: OBGYN | Facility: CLINIC | Age: 30
End: 2020-09-17
Payer: MEDICAID

## 2020-09-17 VITALS — DIASTOLIC BLOOD PRESSURE: 78 MMHG | SYSTOLIC BLOOD PRESSURE: 114 MMHG | WEIGHT: 122 LBS | BODY MASS INDEX: 19.7 KG/M2

## 2020-09-17 DIAGNOSIS — Z11.3 SCREENING EXAMINATION FOR VENEREAL DISEASE: ICD-10-CM

## 2020-09-17 PROCEDURE — 87491 CHLMYD TRACH DNA AMP PROBE: CPT

## 2020-09-17 PROCEDURE — 87510 GARDNER VAG DNA DIR PROBE: CPT

## 2020-09-17 PROCEDURE — 87480 CANDIDA DNA DIR PROBE: CPT

## 2020-09-17 PROCEDURE — 87660 TRICHOMONAS VAGIN DIR PROBE: CPT

## 2020-09-17 PROCEDURE — 99213 OFFICE O/P EST LOW 20 MIN: CPT | Performed by: OBSTETRICS & GYNECOLOGY

## 2020-09-17 PROCEDURE — 87591 N.GONORRHOEAE DNA AMP PROB: CPT

## 2020-09-17 ASSESSMENT — FIBROSIS 4 INDEX: FIB4 SCORE: 0.62

## 2020-09-17 NOTE — TELEPHONE ENCOUNTER
----- Message from Esmer Perdomo sent at 9/16/2020  4:51 PM PDT -----  Regarding: Test Result Question  Contact: [phone]  So I was just informed that someon I had slept with tested positive for trichiminosis was wondering if u could set me up to get tested ASAP    9/17/20 1132 Spoke with pt, desires full STD screening, including bloodwork. Offered appt for today at 1345, pt agreed and scheduled

## 2020-09-17 NOTE — PROGRESS NOTES
S: 30-year-old  5 para 3-0-2-3, last menstrual period 2020, not currently using contraception, presents with request for STD testing.  She thinks she may have been exposed to trichomonas.  She complains of some white vaginal discharge, which she thinks might be normal.  She denies pelvic pain or abnormal bleeding.  She is scheduled to have a Liletta IUD inserted.    O:/78   Wt 55.3 kg (122 lb)      GENERAL: Alert, in no apparent distress  PSYCHIATRIC: Appropriate affect, intact insight and judgement.  ABDOMEN: Soft, nontender, nondistended.  No palpable masses.  No rebound or guarding.  No inguinal lymphadenopathy.  No hepatosplenomegaly.  No hernias.  EXTREMITIES: No edema  SKIN: No rash    GENITOURINARY:  Normal external genitalia, no lesions.  Normal urethral meatus, no masses or tenderness.  Normal bladder without fullness or masses.  Vagina well estrogenized, white, adherent discharge is present.  Cervix without lesions or discharge, nontender.  Uterus normal size, shape, and contour, nontender.  Adnexa nontender, no masses.  Normal anus and perineum.    Rectal Exam - not indicated.    A/P: Vaginal discharge, possible trichomonas infection, request for STD testing -swab obtained for gonorrhea, chlamydia, and trichomonas.  Lab slip given for HIV, hepatitis B, and syphilis testing.    Follow-up PRN.

## 2020-09-17 NOTE — NON-PROVIDER
Patient here c/o for STD testing   LMP= 9/4/2020  BCM: none  Last pap date/result: 7/1/19 [facility]  Last mammogram if applicable  Phone number: [phone]  Pharmacy confirmed.

## 2020-09-18 DIAGNOSIS — Z11.3 SCREENING EXAMINATION FOR VENEREAL DISEASE: ICD-10-CM

## 2020-09-18 LAB
C TRACH DNA SPEC QL NAA+PROBE: NEGATIVE
CANDIDA DNA VAG QL PROBE+SIG AMP: NEGATIVE
G VAGINALIS DNA VAG QL PROBE+SIG AMP: POSITIVE
N GONORRHOEA DNA SPEC QL NAA+PROBE: NEGATIVE
SPECIMEN SOURCE: NORMAL
T VAGINALIS DNA VAG QL PROBE+SIG AMP: NEGATIVE

## 2020-09-18 RX ORDER — METRONIDAZOLE 500 MG/1
500 TABLET ORAL 2 TIMES DAILY
Qty: 14 TAB | Refills: 0 | Status: SHIPPED | OUTPATIENT
Start: 2020-09-18 | End: 2021-07-25

## 2020-09-28 ENCOUNTER — TELEPHONE (OUTPATIENT)
Dept: OBGYN | Facility: CLINIC | Age: 30
End: 2020-09-28

## 2020-09-28 NOTE — TELEPHONE ENCOUNTER
9/28/2020. Per Lazaro Estrada, pt can discuss at her upcoming appt. Unable to provide Rx for Plan B due to it being outside of appropriate  window. Pt has an appt on 10/1/2020.   ----- Message from Esmer Perdomo sent at 9/25/2020  3:39 PM PDT -----  Regarding: RE: Prescription Question  Contact: [phone]  3 days ago I need the perscription I'm not asking for u guys to figure anything out in asking for a prescription to get the day after pill so I can use my savings card and get it for ten dollars so I don't get pregnant look it's been about two week since my period so please give me the  prescription this is stressing me out I know about it all when I'm fertile everything     ----- Message -----  From: Charlene Alejandro, Med Ass't  Sent: 9/25/20, 2:52 PM  To: Esmer Perdomo  Subject: RE: Prescription Question    Garo Lyman,     I spoke to one of our midwives here.  She needs to know when was the first day of your last period and when was the last time you had unprotected intercourse.    Abeba      ----- Message -----       From:Esmer Perdomo       Sent:9/24/2020  6:17 PM PDT         To:SIERRA Nicholson    Subject:RE: Prescription Question    Seriously? I need you to write me a prescription so I can afford the plan b with my savings card I was writing Dr. Estrada I don't have time to go back and forth about questions or it's gonna be too late for me to take the pill  I had unprotected sex and nothing was stopped Thankyou please write me a prescription and if you can just put it on here or send it to my normal pharmacy  Have a good day      ----- Message -----       From:Marielena Hester, Med Ass't       Sent:9/24/2020 11:14 AM PDT         To:Esmer Perdomo    Subject:RE: Prescription Question    Esmer,    When was the 1st day of your last period?    Abisai      ----- Message -----       From:Esmer Perdomo       Sent:9/23/2020  7:34 PM PDT         To:Bambi  SIERRA Estrada    Subject:Prescription Question    Hey will you please get me a prescription to planb so that I can use that savings card becauee I did it again also was wondering if we can move appt to [facility] because I don't want to get pregnant so I just need to have the birth control out in ASAP

## 2020-09-30 ENCOUNTER — TELEPHONE (OUTPATIENT)
Dept: OBGYN | Facility: CLINIC | Age: 30
End: 2020-09-30

## 2020-09-30 NOTE — TELEPHONE ENCOUNTER
----- Message from Shelia Garcai M.D. sent at 9/18/2020  4:39 PM PDT -----  Please call patient and inform her that the swab returned positive for BV.  I have placed a prescription for metronidazole 500 mg p.o. twice daily for 7 days to her pharmacy.  No alcohol while taking this medication.    9/30/2020 0869 Left message for pt to call back.   0943 pt called back and pt states she already picked up Rx from pharmacy and explained Abx are for +BV. Pt verbalized understanding.

## 2021-01-01 ENCOUNTER — HOSPITAL ENCOUNTER (EMERGENCY)
Facility: MEDICAL CENTER | Age: 31
End: 2021-01-02
Attending: EMERGENCY MEDICINE
Payer: MEDICAID

## 2021-01-01 ENCOUNTER — APPOINTMENT (OUTPATIENT)
Dept: RADIOLOGY | Facility: MEDICAL CENTER | Age: 31
End: 2021-01-01
Attending: EMERGENCY MEDICINE
Payer: MEDICAID

## 2021-01-01 DIAGNOSIS — R50.9 FEVER, UNSPECIFIED FEVER CAUSE: ICD-10-CM

## 2021-01-01 DIAGNOSIS — R06.02 SHORTNESS OF BREATH: ICD-10-CM

## 2021-01-01 DIAGNOSIS — B34.9 VIRAL SYNDROME: ICD-10-CM

## 2021-01-01 DIAGNOSIS — Z20.822 SUSPECTED COVID-19 VIRUS INFECTION: ICD-10-CM

## 2021-01-01 LAB
ALBUMIN SERPL BCP-MCNC: 4 G/DL (ref 3.2–4.9)
ALBUMIN/GLOB SERPL: 1.3 G/DL
ALP SERPL-CCNC: 79 U/L (ref 30–99)
ALT SERPL-CCNC: 41 U/L (ref 2–50)
ANION GAP SERPL CALC-SCNC: 8 MMOL/L (ref 7–16)
AST SERPL-CCNC: 27 U/L (ref 12–45)
BASOPHILS # BLD AUTO: 0.5 % (ref 0–1.8)
BASOPHILS # BLD: 0.02 K/UL (ref 0–0.12)
BILIRUB SERPL-MCNC: 0.2 MG/DL (ref 0.1–1.5)
BUN SERPL-MCNC: 16 MG/DL (ref 8–22)
CALCIUM SERPL-MCNC: 9.1 MG/DL (ref 8.4–10.2)
CHLORIDE SERPL-SCNC: 102 MMOL/L (ref 96–112)
CO2 SERPL-SCNC: 28 MMOL/L (ref 20–33)
COVID ORDER STATUS COVID19: NORMAL
CREAT SERPL-MCNC: 0.49 MG/DL (ref 0.5–1.4)
EOSINOPHIL # BLD AUTO: 0.04 K/UL (ref 0–0.51)
EOSINOPHIL NFR BLD: 1.1 % (ref 0–6.9)
ERYTHROCYTE [DISTWIDTH] IN BLOOD BY AUTOMATED COUNT: 42.5 FL (ref 35.9–50)
GLOBULIN SER CALC-MCNC: 3 G/DL (ref 1.9–3.5)
GLUCOSE SERPL-MCNC: 98 MG/DL (ref 65–99)
HCG SERPL QL: NEGATIVE
HCT VFR BLD AUTO: 40.8 % (ref 37–47)
HGB BLD-MCNC: 13.8 G/DL (ref 12–16)
IMM GRANULOCYTES # BLD AUTO: 0 K/UL (ref 0–0.11)
IMM GRANULOCYTES NFR BLD AUTO: 0 % (ref 0–0.9)
LYMPHOCYTES # BLD AUTO: 1.75 K/UL (ref 1–4.8)
LYMPHOCYTES NFR BLD: 47.8 % (ref 22–41)
MCH RBC QN AUTO: 33.3 PG (ref 27–33)
MCHC RBC AUTO-ENTMCNC: 33.8 G/DL (ref 33.6–35)
MCV RBC AUTO: 98.6 FL (ref 81.4–97.8)
MONOCYTES # BLD AUTO: 0.26 K/UL (ref 0–0.85)
MONOCYTES NFR BLD AUTO: 7.1 % (ref 0–13.4)
NEUTROPHILS # BLD AUTO: 1.59 K/UL (ref 2–7.15)
NEUTROPHILS NFR BLD: 43.5 % (ref 44–72)
NRBC # BLD AUTO: 0 K/UL
NRBC BLD-RTO: 0 /100 WBC
PLATELET # BLD AUTO: 262 K/UL (ref 164–446)
PMV BLD AUTO: 8.5 FL (ref 9–12.9)
POTASSIUM SERPL-SCNC: 4.1 MMOL/L (ref 3.6–5.5)
PROT SERPL-MCNC: 7 G/DL (ref 6–8.2)
RBC # BLD AUTO: 4.14 M/UL (ref 4.2–5.4)
SODIUM SERPL-SCNC: 138 MMOL/L (ref 135–145)
WBC # BLD AUTO: 3.7 K/UL (ref 4.8–10.8)

## 2021-01-01 PROCEDURE — 71045 X-RAY EXAM CHEST 1 VIEW: CPT

## 2021-01-01 PROCEDURE — U0005 INFEC AGEN DETEC AMPLI PROBE: HCPCS

## 2021-01-01 PROCEDURE — 81003 URINALYSIS AUTO W/O SCOPE: CPT

## 2021-01-01 PROCEDURE — 85025 COMPLETE CBC W/AUTO DIFF WBC: CPT

## 2021-01-01 PROCEDURE — U0003 INFECTIOUS AGENT DETECTION BY NUCLEIC ACID (DNA OR RNA); SEVERE ACUTE RESPIRATORY SYNDROME CORONAVIRUS 2 (SARS-COV-2) (CORONAVIRUS DISEASE [COVID-19]), AMPLIFIED PROBE TECHNIQUE, MAKING USE OF HIGH THROUGHPUT TECHNOLOGIES AS DESCRIBED BY CMS-2020-01-R: HCPCS

## 2021-01-01 PROCEDURE — 80053 COMPREHEN METABOLIC PANEL: CPT

## 2021-01-01 PROCEDURE — 84703 CHORIONIC GONADOTROPIN ASSAY: CPT

## 2021-01-01 PROCEDURE — C9803 HOPD COVID-19 SPEC COLLECT: HCPCS | Performed by: EMERGENCY MEDICINE

## 2021-01-01 PROCEDURE — 36415 COLL VENOUS BLD VENIPUNCTURE: CPT

## 2021-01-01 PROCEDURE — 99283 EMERGENCY DEPT VISIT LOW MDM: CPT | Mod: 25

## 2021-01-01 RX ORDER — ACETAMINOPHEN 500 MG
500-1000 TABLET ORAL EVERY 6 HOURS PRN
COMMUNITY
End: 2021-01-02

## 2021-01-01 ASSESSMENT — FIBROSIS 4 INDEX
FIB4 SCORE: 0.62
FIB4 SCORE: 0.62

## 2021-01-01 NOTE — LETTER
"1/2/2021               Esmer Sue Pickeringira  [address]  [place] [address]        Dear Esmer (MR#[id number]),    This letter is to inform you that your COVID-19 test result is POSITIVE.  This means that the virus that causes COVID-19 was found in your sample.      SARS-CoV-2 Source   Date Value Ref Range Status   01/01/2021 NP Swab  Final     SARS-CoV-2 by PCR   Date Value Ref Range Status   01/01/2021 DETECTED (AA)  Final     Comment:     PATIENTS: Important information regarding your results and instructions can  be found at [email or web address]   \"After your  Covid-19 Test\"  **The [employer] COVID-19 SARS-CoV-2 test has been made available for use under  the Emergency Use Authorization (EUA) only.         If your symptoms are generally mild and stable, please isolate yourself at home. If it becomes difficult to breathe, contact your healthcare provider as soon as possible.    Next steps:  -  Stay home except to get medical care.  -  Follow the instructions for home isolation below.  -  Call ahead before visiting your healthcare provider or a hospital.  -  Go to the nearest emergency department for worsening symptoms including difficulty breathing, ongoing fever, weakness or chest pain.    You should isolate yourself:  -  For a minimum of 10 days from symptom onset or positive test and  -  At least 24 hours have passed since your last fever without the use of fever-reducing medications and  -  All other symptoms have improved (loss of taste and smell may persist for weeks or months after recovery and should not delay the end of isolation)    Instructions for Self-Isolation at Home:  -  We recommend you stay in your home and minimize contact with others to avoid spreading this infection.  -  Do not go to work, school or public areas unless otherwise directed by the health department. Avoid using public transportation, ridesharing or taxis.  -  Separate yourself from other people in " your home as much as possible.  -  Stay in a specific room and away from other people in your home as much as possible. Use a separate bathroom if possible.        When seeking care at a healthcare facility:  -  Seek prompt medical attention if your illness is worsening (e.g., difficulty breathing).  -  When possible, call the healthcare provider before arriving.  -  Put on a facemask before you enter the facility.  -  If possible, put on a facemask before the ambulance or paramedics arrive.  -  These steps will help the healthcare provider's office prevent other people from getting infected or exposed.    Once any symptoms have resolved, it may be possible to donate plasma to help others that are currently ill with COVID-19. To learn more and apply, please contact the  at [phone] or via e-mail at [email or web address].    For any further questions regarding COVID-19, please contact your [facility]'s health department or healthcare provider.        Sincerely,    [facility] Care Team

## 2021-01-02 VITALS
TEMPERATURE: 97.7 F | WEIGHT: 132.28 LBS | RESPIRATION RATE: 20 BRPM | DIASTOLIC BLOOD PRESSURE: 76 MMHG | OXYGEN SATURATION: 97 % | SYSTOLIC BLOOD PRESSURE: 116 MMHG | BODY MASS INDEX: 21.26 KG/M2 | HEART RATE: 95 BPM | HEIGHT: 66 IN

## 2021-01-02 LAB
APPEARANCE UR: CLEAR
BILIRUB UR QL STRIP.AUTO: NEGATIVE
COLOR UR: YELLOW
GLUCOSE UR STRIP.AUTO-MCNC: NEGATIVE MG/DL
KETONES UR STRIP.AUTO-MCNC: NEGATIVE MG/DL
LEUKOCYTE ESTERASE UR QL STRIP.AUTO: NEGATIVE
MICRO URNS: NORMAL
NITRITE UR QL STRIP.AUTO: NEGATIVE
PH UR STRIP.AUTO: 6 [PH] (ref 5–8)
PROT UR QL STRIP: NEGATIVE MG/DL
RBC UR QL AUTO: NEGATIVE
SARS-COV-2 RNA RESP QL NAA+PROBE: DETECTED
SP GR UR REFRACTOMETRY: 1.03
SPECIMEN SOURCE: ABNORMAL

## 2021-01-02 RX ORDER — ACETAMINOPHEN 325 MG/1
650 TABLET ORAL EVERY 6 HOURS PRN
Qty: 30 TAB | Refills: 0 | Status: SHIPPED | OUTPATIENT
Start: 2021-01-02 | End: 2021-07-25

## 2021-01-02 RX ORDER — IBUPROFEN 400 MG/1
400 TABLET ORAL EVERY 6 HOURS PRN
Qty: 30 TAB | Refills: 0 | Status: SHIPPED | OUTPATIENT
Start: 2021-01-02 | End: 2021-07-25

## 2021-01-02 NOTE — ED TRIAGE NOTES
"Pt states that she believes she has PNA. Pt has had fevers at home and it now hurts to take a deep breath.     /89   Pulse (!) 114   Temp 36.5 °C (97.7 °F) (Temporal)   Resp 20   Ht 1.676 m (5' 6\")   Wt 60 kg (132 lb 4.4 oz)   LMP 12/25/2020   SpO2 98%   BMI 21.35 kg/m²     "

## 2021-01-02 NOTE — ED NOTES
Pt provided discharge paper work and educated on how to access my chart for test results. Pt declines any questions or concerns at this time.

## 2021-01-02 NOTE — ED PROVIDER NOTES
ED Provider Note    CHIEF COMPLAINT  Chief Complaint   Patient presents with   • Fever     102 at home when she woke up    • Shortness of Breath     states that it is painful to take a deep breath      HPI  30 y.o. presents to the emergency department with a chief complaint of influenza like illness beginning 5 days ago.  The patient has a multitude of complaints mostly that she has some chest discomfort, the development of fever, body aches and chills that have been going on for 5 days.  This coincided with the loss of taste and smell during this period of time and she is extremely anxious that she has developed an infectious process such as pneumonia or influenza or Covid and that she could pass this onto her infant son.  She is extremely anxious on initial assessment with labile pulse that was initially noted to be tachycardic however throughout the discussion when she is resting and breathing comfortably resting heart rate is in the mid to high 80s.  Her fever was high as 102 several days ago and has been responsive to OTC medications, she denies any chest pressure sensations, no exertional symptomology, no history of prior DVT or PE and no recent travel, no bedbound status or recent immobility and no recent surgical procedures.    The following risk factors are present: None    Potential COVID-19 exposure history: NO Exposure history    REVIEW OF SYSTEMS  Constitutional: + fever, No unintentional recent weight loss  Skin: No rashes or diaphoresis.  Eyes: No change in vision, no discharge.  ENT: No hearing change. No rhinorrhea or nasal congestion, no sore throat or difficulty swallowing.  Respiratory: as above, No coughing or hemoptysis. No Wheezing.  Cardiac: No chest pain, palpitations, edema.  GI: No Abdominal Pain; N/V; diarrhea, constipation. No blood in stool.  : No dysuria. No new discharge. No frequency or urgency. No hesitancy. LMP=3 wks ago  MSK: No calf pain or swelling.  Neuro: No HA or  "paresthesias. No focal weakness.  Endocrine: No polyuria or polydipsia. No heat or cold intolerance.  Heme: No easy bruising. No history of bleeding disorders.    PAST MEDICAL HISTORY   has a past medical history of ASCUS of cervix with negative high risk HPV.    SOCIAL HISTORY  Social History     Tobacco Use   • Smoking status: Current Every Day Smoker     Types: Cigarettes   • Smokeless tobacco: Never Used   • Tobacco comment: pack at day or every 2 days   Substance and Sexual Activity   • Alcohol use: No   • Drug use: Yes     Types: Inhaled     Comment: Marijuana    • Sexual activity: Yes     Partners: Male     SURGICAL HISTORY   has a past surgical history that includes eye surgery (Right) and other orthopedic surgery (Right).    CURRENT MEDICATIONS  Home Medications     Reviewed by Mirian Cabrera R.N. (Registered Nurse) on 01/01/21 at 2147  Med List Status: Complete   Medication Last Dose Status   docusate sodium 100 MG Cap  Active   ferrous sulfate 325 (65 Fe) MG tablet  Active   ibuprofen (MOTRIN) 600 MG Tab  Active   metroNIDAZOLE (FLAGYL) 500 MG Tab Not Taking Active   Prenatal MV-Min-Fe Fum-FA-DHA (PRENATAL 1 PO)  Active              ALLERGIES  No Known Allergies    PHYSICAL EXAM  VITAL SIGNS: /78   Pulse 95   Temp 36.5 °C (97.7 °F) (Temporal)   Resp 20   Ht 1.676 m (5' 6\")   Wt 60 kg (132 lb 4.4 oz)   LMP 12/25/2020   SpO2 97%   BMI 21.35 kg/m²    Genl: Sitting comfortably, speaking clearly, appears in no acute distress   Head: NC/AT   ENT: Mucous membranes moist, posterior pharynx clear, uvula midline, nares patent bilaterally   Eyes: Normal sclera, pupils equal round reactive to light  Neck: Supple, FROM, no obvious LAD appreciated   Pulmonary: Lungs are clear to auscultation bilaterally.   Chest: No TTP  CV:  RRR, no murmur appreciated, pulses 2+ in both upper and lower extremities,  Abdomen: soft, NT/ND; no rebound/guarding, no masses palpated, no HSM   : no CVA or suprapubic " tenderness   Musculoskeletal: Pain free ROM of the neck. Moving upper and lower extremities and spontaneous in coordinated fashion  Neuro: A&Ox4 (person, place, time, situation), speech fluent, no focal deficits appreciated  Psych: Patient has an appropriate affect and behavior  Skin: No rash or lesions.  No pallor or jaundice.  No cyanosis.  Warm and dry.     COURSE & MEDICAL DECISION MAKING  DX-CHEST-PORTABLE (1 VIEW)   Final Result      1.  There is no acute cardiopulmonary process.        Labs Reviewed   CBC WITH DIFFERENTIAL - Abnormal; Notable for the following components:       Result Value    WBC 3.7 (*)     RBC 4.14 (*)     MCV 98.6 (*)     MCH 33.3 (*)     MPV 8.5 (*)     Neutrophils-Polys 43.50 (*)     Lymphocytes 47.80 (*)     Neutrophils (Absolute) 1.59 (*)     All other components within normal limits   COMP METABOLIC PANEL - Abnormal; Notable for the following components:    Creatinine 0.49 (*)     All other components within normal limits   COVID/SARS COV-2    Narrative:     Is patient being admitted?->No  Is the patient a resident in a congregate care setting?->No  Expected turn around time?->Routine (In-House PCR up to 24  hours)  Have you been in close contact with a person who is suspected  or known to be positive for COVID-19 within the last 30 days  (e.g. last seen that person < 30 days ago)->No   HCG QUAL SERUM   SARS-COV-2, PCR (IN-HOUSE)    Narrative:     Is patient being admitted?->No  Is the patient a resident in a congregate care setting?->No  Expected turn around time?->Routine (In-House PCR up to 24  hours)  Have you been in close contact with a person who is suspected  or known to be positive for COVID-19 within the last 30 days  (e.g. last seen that person < 30 days ago)->No   ESTIMATED GFR   URINALYSIS       Pertinent Labs & Imaging studies reviewed. (See chart for details)    MDM  1.) As of 01/01/21, I used the current [facility] PUI algorithm to help guide my medical decision making  and testing.   I verified that the patient was wearing a mask and I was wearing appropriate PPE during each encounter with patient. The patient's mask was on the patient at all times during my encounter except for a brief view of the oropharynx.    2.) Patient presents with symptoms concerning for influenza like illness as described above.  During the initial assessment it is noted that the ptis not in respiratory distress.  The constellation of symptoms are suggestive of viral illness with potential pneumonia, sepsis and unique infections due to current community outbreak.  I discussed her vital signs, which show no hypotension, no febrile illness and no acute tachypnea or hypoxia.  She does have lability in her pulse however at rest she is under 100 bpm.  The concern would be that her vital signs would not clear her of PERC criteria with some chest discomfort I cannot fully exclude the possibility of a PE.  She does not have risk factors and lability does not portend inappropriate tachycardia from right heart strain.  We did extensively discuss this and at this time I do feel that getting a D-dimer test would be unnecessary without any clinical deterioration.  She is amenable to this plan.  Resting heart rate remains an appropriate level.  Chest x-ray shows no evidence of pneumonia, no other acute cardiopulmonary process.  She has CBC changes that point towards some leukopenia and lymphopenia consistent with prolonged viral infection and with her constitutional complaints this is likely to be Covid.  With her stable vital signs outpatient Covid test is obtained and will not result acutely.  She has no other gross metabolic derangement.  Urinalysis is obtained and shows no evidence of acute infectious or stone pathology. At this time, the patient is clear for discharge and understands the likelihood of Covid diagnosis, the need for maintaining her quarantine until clear symptoms for greater of 7 to 10 days and  knows that worsening symptomology will warrant repeat assessment here in the emergency department.    3.) Disposition:DISCHARGE: The patient is appropriate for outpatient treatment at this time. They are defined as lower risk using a combination of PSI/PORT score, physical exam and history of present illness.  I discussed with the patient that they likely have a viral illness and may have COVID-19.   Because we are awaiting test results regarding Covid-19, we discussed that the patient will need to remain in isolation until all three of the following are true: You are 10 days from symptom onset, your symptoms are improving AND you have been fever free for at least 72 hours.  They agreed to return to the emergency department immediately for worsening or persistent symptoms, including but not limited to significant or worsening shortness of breath or severe lightheadedness.      Visit Diagnoses     ICD-10-CM   1. Suspected COVID-19 virus infection  Z20.822   2. Shortness of breath  R06.02   3. Fever, unspecified fever cause  R50.9   4. Viral syndrome  B34.9

## 2021-05-11 ENCOUNTER — HOSPITAL ENCOUNTER (EMERGENCY)
Facility: MEDICAL CENTER | Age: 31
End: 2021-05-11
Attending: EMERGENCY MEDICINE
Payer: MEDICAID

## 2021-05-11 VITALS
BODY MASS INDEX: 20.73 KG/M2 | SYSTOLIC BLOOD PRESSURE: 128 MMHG | DIASTOLIC BLOOD PRESSURE: 83 MMHG | WEIGHT: 128.97 LBS | HEART RATE: 95 BPM | TEMPERATURE: 98.3 F | HEIGHT: 66 IN | OXYGEN SATURATION: 99 % | RESPIRATION RATE: 16 BRPM

## 2021-05-11 DIAGNOSIS — L01.00 IMPETIGO: ICD-10-CM

## 2021-05-11 PROCEDURE — 99281 EMR DPT VST MAYX REQ PHY/QHP: CPT

## 2021-05-11 ASSESSMENT — PAIN DESCRIPTION - PAIN TYPE: TYPE: ACUTE PAIN

## 2021-05-11 ASSESSMENT — FIBROSIS 4 INDEX: FIB4 SCORE: 0.52

## 2021-05-12 NOTE — ED NOTES
Pt sitting in recliner with son at bedside. Rash noted on right lower jaw area; no opening or bleeding noted. Pt able to breathe on own and speak in full sentences at this time.

## 2021-05-12 NOTE — ED PROVIDER NOTES
ED Provider Note    CHIEF COMPLAINT  Chief Complaint   Patient presents with   • Rash     Isolated to face x 2 wks Denies SOB Worried about scabies       HPI  Esmer Perdomo is a 30 y.o. female who presents rash to her face x2 weeks.  She states there is a small rash below her right lip that crusted up and then she feels a scab off.  Has been increasing in severity for 2 weeks.  She has fever, shakes, chills, sweats.  She has had a before in the past.  REVIEW OF SYSTEMS  Pertinent positives include rash to the right cheek below the lip  Pertinent negatives include fever, discharge    PAST MEDICAL HISTORY  Past Medical History:   Diagnosis Date   • ASCUS of cervix with negative high risk HPV        FAMILY HISTORY  Family History   Problem Relation Age of Onset   • Heart Disease Father    • Diabetes Father        SOCIAL HISTORY  Social History     Socioeconomic History   • Marital status:      Spouse name: Not on file   • Number of children: Not on file   • Years of education: Not on file   • Highest education level: Not on file   Occupational History   • Not on file   Tobacco Use   • Smoking status: Current Every Day Smoker     Types: Cigarettes   • Smokeless tobacco: Never Used   • Tobacco comment: pack at day or every 2 days   Substance and Sexual Activity   • Alcohol use: No   • Drug use: Yes     Types: Inhaled     Comment: Marijuana    • Sexual activity: Yes     Partners: Male   Other Topics Concern   • Not on file   Social History Narrative   • Not on file     Social Determinants of Health     Financial Resource Strain:    • Difficulty of Paying Living Expenses:    Food Insecurity:    • Worried About Running Out of Food in the Last Year:    • Ran Out of Food in the Last Year:    Transportation Needs:    • Lack of Transportation (Medical):    • Lack of Transportation (Non-Medical):    Physical Activity:    • Days of Exercise per Week:    • Minutes of Exercise per Session:    Stress:    • Feeling  "of Stress :    Social Connections:    • Frequency of Communication with Friends and Family:    • Frequency of Social Gatherings with Friends and Family:    • Attends [employer] Services:    • Active Member of Clubs or Organizations:    • Attends Club or Organization Meetings:    • Marital Status:    Intimate Partner Violence:    • Fear of Current or Ex-Partner:    • Emotionally Abused:    • Physically Abused:    • Sexually Abused:        SURGICAL HISTORY  Past Surgical History:   Procedure Laterality Date   • EYE SURGERY Right    • OTHER ORTHOPEDIC SURGERY Right     laceration closure       CURRENT MEDICATIONS  Home Medications    **Home medications have not yet been reviewed for this encounter**         ALLERGIES  No Known Allergies    PHYSICAL EXAM  VITAL SIGNS: /83   Pulse 95   Temp 36.8 °C (98.3 °F) (Temporal)   Resp 16   Ht 1.676 m (5' 6\")   Wt 58.5 kg (128 lb 15.5 oz)   LMP 05/09/2021   SpO2 99%   Breastfeeding No   BMI 20.82 kg/m²    Constitutional: Well developed, Well nourished, No acute distress, Non-toxic appearance.   HENT: Normocephalic, Atraumatic, 1.5 cm erythematous, crusted lesion on the cheek just below the right lip no dental abnormality, no intraoral abnormality.    Eyes: PERRLA, EOMI, Conjunctiva normal, No discharge.    Skin:1.5 cm erythematous, crusted lesion on the cheek just below the right lip        COURSE & MEDICAL DECISION MAKING  Pertinent Labs & Imaging studies reviewed. (See chart for details)  This is a 30-year-old female presents with probable impetigo to the right cheek.  The patient has no evidence of abscess, no evidence of scabies.  The patient received Bactroban and was instructed follow the primary care physician in 10 days if she did have an significant improvement.    Discharge Medication List as of 5/11/2021  9:34 PM      START taking these medications    Details   mupirocin (BACTROBAN) 2 % Ointment Apply 1 Application topically 2 times a day for 10 days., " Disp-22 g, R-0, Normal           FINAL IMPRESSION     1. Impetigo Active     DISPOSITION:  Patient will be discharged home in stable condition.    FOLLOW UP:  [facility], Emergency Dept  [address]  [place] [address] [phone]    If symptoms worsen      OUTPATIENT MEDICATIONS:  Discharge Medication List as of 5/11/2021  9:34 PM      START taking these medications    Details   mupirocin (BACTROBAN) 2 % Ointment Apply 1 Application topically 2 times a day for 10 days., Disp-22 g, R-0, Normal           Electronically signed by: Blayne Dumont D.O., 5/11/2021 9:24 PM

## 2021-05-12 NOTE — DISCHARGE INSTRUCTIONS
Please follow-up with your primary care physician within 10 days if the lesion still persists as you may need a dermatology consultation/referral.

## 2021-07-22 ENCOUNTER — HOSPITAL ENCOUNTER (OUTPATIENT)
Dept: LAB | Facility: MEDICAL CENTER | Age: 31
End: 2021-07-22
Attending: OBSTETRICS & GYNECOLOGY
Payer: MEDICAID

## 2021-07-22 DIAGNOSIS — Z11.3 SCREENING EXAMINATION FOR VENEREAL DISEASE: ICD-10-CM

## 2021-07-22 PROCEDURE — 87389 HIV-1 AG W/HIV-1&-2 AB AG IA: CPT

## 2021-07-22 PROCEDURE — 86803 HEPATITIS C AB TEST: CPT

## 2021-07-22 PROCEDURE — 86780 TREPONEMA PALLIDUM: CPT

## 2021-07-22 PROCEDURE — 36415 COLL VENOUS BLD VENIPUNCTURE: CPT

## 2021-07-23 LAB
HCV AB SER QL: NORMAL
HIV 1+2 AB+HIV1 P24 AG SERPL QL IA: NORMAL
TREPONEMA PALLIDUM IGG+IGM AB [PRESENCE] IN SERUM OR PLASMA BY IMMUNOASSAY: NORMAL

## 2021-07-25 ENCOUNTER — HOSPITAL ENCOUNTER (EMERGENCY)
Facility: MEDICAL CENTER | Age: 31
End: 2021-07-25
Attending: EMERGENCY MEDICINE
Payer: MEDICAID

## 2021-07-25 VITALS
SYSTOLIC BLOOD PRESSURE: 115 MMHG | BODY MASS INDEX: 20.94 KG/M2 | RESPIRATION RATE: 18 BRPM | DIASTOLIC BLOOD PRESSURE: 66 MMHG | HEART RATE: 91 BPM | WEIGHT: 130.29 LBS | OXYGEN SATURATION: 99 % | HEIGHT: 66 IN | TEMPERATURE: 98.4 F

## 2021-07-25 DIAGNOSIS — N76.0 BACTERIAL VAGINITIS: ICD-10-CM

## 2021-07-25 DIAGNOSIS — B96.89 BACTERIAL VAGINITIS: ICD-10-CM

## 2021-07-25 DIAGNOSIS — N39.0 URINARY TRACT INFECTION WITHOUT HEMATURIA, SITE UNSPECIFIED: ICD-10-CM

## 2021-07-25 LAB
APPEARANCE UR: ABNORMAL
BACTERIA #/AREA URNS HPF: ABNORMAL /HPF
BACTERIA GENITAL QL WET PREP: NORMAL
BILIRUB UR QL STRIP.AUTO: NEGATIVE
COLOR UR: YELLOW
EPI CELLS #/AREA URNS HPF: ABNORMAL /HPF
GLUCOSE UR STRIP.AUTO-MCNC: NEGATIVE MG/DL
HCG UR QL: NEGATIVE
HYALINE CASTS #/AREA URNS LPF: ABNORMAL /LPF
KETONES UR STRIP.AUTO-MCNC: NEGATIVE MG/DL
LEUKOCYTE ESTERASE UR QL STRIP.AUTO: ABNORMAL
MICRO URNS: ABNORMAL
NITRITE UR QL STRIP.AUTO: POSITIVE
PH UR STRIP.AUTO: 6 [PH] (ref 5–8)
PROT UR QL STRIP: NEGATIVE MG/DL
RBC # URNS HPF: ABNORMAL /HPF
RBC UR QL AUTO: ABNORMAL
SIGNIFICANT IND 70042: NORMAL
SITE SITE: NORMAL
SOURCE SOURCE: NORMAL
SP GR UR STRIP.AUTO: 1.02
WBC #/AREA URNS HPF: ABNORMAL /HPF

## 2021-07-25 PROCEDURE — 99284 EMERGENCY DEPT VISIT MOD MDM: CPT

## 2021-07-25 PROCEDURE — 87491 CHLMYD TRACH DNA AMP PROBE: CPT

## 2021-07-25 PROCEDURE — 81001 URINALYSIS AUTO W/SCOPE: CPT

## 2021-07-25 PROCEDURE — A9270 NON-COVERED ITEM OR SERVICE: HCPCS | Performed by: EMERGENCY MEDICINE

## 2021-07-25 PROCEDURE — 81025 URINE PREGNANCY TEST: CPT

## 2021-07-25 PROCEDURE — 700102 HCHG RX REV CODE 250 W/ 637 OVERRIDE(OP): Performed by: EMERGENCY MEDICINE

## 2021-07-25 PROCEDURE — 87591 N.GONORRHOEAE DNA AMP PROB: CPT

## 2021-07-25 RX ORDER — METRONIDAZOLE 500 MG/1
500 TABLET ORAL 3 TIMES DAILY
Qty: 20 TABLET | Refills: 0 | Status: SHIPPED | OUTPATIENT
Start: 2021-07-25 | End: 2022-04-12

## 2021-07-25 RX ORDER — METRONIDAZOLE 500 MG/1
500 TABLET ORAL ONCE
Status: COMPLETED | OUTPATIENT
Start: 2021-07-25 | End: 2021-07-25

## 2021-07-25 RX ORDER — SULFAMETHOXAZOLE AND TRIMETHOPRIM 800; 160 MG/1; MG/1
1 TABLET ORAL 2 TIMES DAILY
Qty: 10 TABLET | Refills: 0 | Status: SHIPPED | OUTPATIENT
Start: 2021-07-25 | End: 2022-04-12

## 2021-07-25 RX ORDER — SULFAMETHOXAZOLE AND TRIMETHOPRIM 800; 160 MG/1; MG/1
1 TABLET ORAL ONCE
Status: COMPLETED | OUTPATIENT
Start: 2021-07-25 | End: 2021-07-25

## 2021-07-25 RX ORDER — ACETAMINOPHEN 500 MG
1000 TABLET ORAL ONCE
Status: COMPLETED | OUTPATIENT
Start: 2021-07-25 | End: 2021-07-25

## 2021-07-25 RX ADMIN — ACETAMINOPHEN 1000 MG: 500 TABLET, FILM COATED ORAL at 21:02

## 2021-07-25 RX ADMIN — SULFAMETHOXAZOLE AND TRIMETHOPRIM 1 TABLET: 800; 160 TABLET ORAL at 20:40

## 2021-07-25 RX ADMIN — METRONIDAZOLE 500 MG: 500 TABLET ORAL at 20:40

## 2021-07-25 ASSESSMENT — FIBROSIS 4 INDEX: FIB4 SCORE: 0.53

## 2021-07-25 NOTE — LETTER
7/29/2021               Esmer Sue Perdomo  [address] [address]  [facility] [address]        Dear Esmer (MR#[id number]):    As we have been unable to contact you by phone, this letter is sent in regards to your recent visit to the [facility] Emergency Department on 7/25/2021. During the visit, tests were performed to assist the physician in a medical diagnosis. A review of those tests requires that we notify you of the following:    Your culture and sensitivity result was POSITIVE for a sexually transmitted bacteria - Chlamydia trachomitis andTrichomoniasis. THE CHLAMYDIA WAS NOT TREATED IN THE EMERGENCY DEPARTMENT OR ON DISCHARGE AND REQUIRES TREATMENT. I have called in a new prescription for doxycycline for you to take for the next 7 days to the [facility] on [facility] ([phone]).        Based on the above findings, it is important that you get the needed treatment in order to prevent long term health issues. It is recommended that you seek testing for the presence of additional sexually transmitted diseases, hepatitis, and HIV from the Health Department. Also, it is advised that you inform your sexual partner(s) within the previous 60 days of the above findings and direct them to the Health Department to test for sexually transmitted diseases.        Thank you for your cooperation in the matter.    Sincerely,  ED Culture Follow-Up Staff  Cristine Altman, PharmD    Formerly [facility], Emergency Department  [facility] [place], [place] [address] [phone] (ED Culture Line)

## 2021-07-26 NOTE — ED PROVIDER NOTES
ED Provider Note    ED Provider    Means of arrival: Patient  History obtained from: Patient  History limited by: Patient    CHIEF COMPLAINT  Chief Complaint   Patient presents with   • Difficulty Urinating     Patient report of urinary urgency this morning after having unprotected sex yesterday and lower back pain with a headache for three days.  Denies any recent trauma. Not sure of any vaginal discharge.    • Back Pain   • Headache       HPI  Esmer Perdomo is a 31 y.o. female who presents with complaints of dysuria, frequency and urgency.  Is also complaining of a mild bilateral back pain the back pain is worse with movement.  The dysuria started this morning.  She has concerns because she had unprotected sex last night she feels like something is obstructing her from being able to urinate.    No fevers chills or sweats.  The pain in the back is worse with movement twisting and turning.  She has no history of STDs she states that she recently had screens for HIV, and hepatitis which were negative.    No fevers chills or sweats no nausea vomiting or diarrhea.    REVIEW OF SYSTEMS  See HPI for further details. All other systems are negative.     PAST MEDICAL HISTORY   has a past medical history of ASCUS of cervix with negative high risk HPV.    SOCIAL HISTORY  Social History     Tobacco Use   • Smoking status: Current Every Day Smoker     Types: Cigarettes   • Smokeless tobacco: Never Used   • Tobacco comment: pack at day or every 2 days   Vaping Use   • Vaping Use: Never used   Substance and Sexual Activity   • Alcohol use: No   • Drug use: Yes     Types: Inhaled     Comment: Marijuana    • Sexual activity: Yes     Partners: Male       SURGICAL HISTORY   has a past surgical history that includes eye surgery (Right) and other orthopedic surgery (Right).    CURRENT MEDICATIONS  Home Medications     Reviewed by Isabel Vuong R.N. (Registered Nurse) on 07/25/21 at 5693  Med List Status: Complete  "  Medication Last Dose Status   Prenatal MV-Min-Fe Fum-FA-DHA (PRENATAL 1 PO)  Active                ALLERGIES  No Known Allergies    PHYSICAL EXAM  VITAL SIGNS: /71   Pulse (!) 102   Temp 36.9 °C (98.5 °F) (Tympanic)   Resp 18   Ht 1.676 m (5' 6\")   Wt 59.1 kg (130 lb 4.7 oz)   LMP  (LMP Unknown)   SpO2 98%   BMI 21.03 kg/m²   Constitutional: Alert in no apparent distress.  HENT: No signs of trauma, Mucous membranes are moist   Eyes:  Conjunctiva normal, Non-icteric.   Neck: Normal range of motion, No tenderness, Supple,  Lymphatic: No lymphadenopathy noted.   Cardiovascular: Regular rate and rhythm, no murmurs.   Thorax & Lungs: Normal breath sounds, No respiratory distress, No wheezing, No chest tenderness.   Abdomen: Bowel sounds normal, Soft, No tenderness, No masses, No pulsatile masses. No peritoneal signs.  Skin: Warm, Dry,Normal color  Back: No bony tenderness, No CVA tenderness.   Extremities:No edema, No tenderness, No cyanosis,    Musculoskeletal: Good range of motion in all major joints. No tenderness to palpation or major deformities noted.   Neurologic: Alert ,Oriented x4, Normal motor function, Normal sensory function, No focal deficits noted.   Psychiatric: Affect normal, Judgment normal, Mood normal.   Pelvic exam: Normal external female genitalia, vaginal vault has mild erythema and tenderness.  There is a mild white to yellow discharge.  No cervical motion tenderness is present    MEDICAL DECISION MAKING  This is a 31 y.o. female who presents complaints as above.  There is no CVA tenderness or suspect pyelonephritis there is no fever do not suspect sepsis.  She has symptoms consistent with UTI she will have urinalysis done.  Pelvic exam was done to evaluate for GC and chlamydia.    DIAGNOSTIC STUDIES / PROCEDURES    EKG      LABS  Results for orders placed or performed during the hospital encounter of 07/25/21   URINALYSIS (UA)    Specimen: Urine   Result Value Ref Range    Color " Yellow     Character Cloudy (A)     Specific Gravity 1.020 <1.035    Ph 6.0 5.0 - 8.0    Glucose Negative Negative mg/dL    Ketones Negative Negative mg/dL    Protein Negative Negative mg/dL    Bilirubin Negative Negative    Nitrite Positive (A) Negative    Leukocyte Esterase Moderate (A) Negative    Occult Blood Moderate (A) Negative    Micro Urine Req Microscopic    HCG QUALITATIVE UR (Lab)   Result Value Ref Range    Beta-Hcg Urine Negative Negative   URINE MICROSCOPIC (W/UA)   Result Value Ref Range    WBC Packed (A) /hpf    RBC 10-20 (A) /hpf    Bacteria Many (A) None /hpf    Epithelial Cells Few Few /hpf    Hyaline Cast 0-2 /lpf   Chlamydia/GC PCR Urine Or Swab    Specimen: Genital   Result Value Ref Range    Source Vaginal    WET PREP    Specimen: Vaginal; Genital   Result Value Ref Range    Significant Indicator NEG     Source GEN     Site VAGINAL     Wet Prep For Parasites       No clue cells seen.  No yeast.  Few motile Trichomonas seen.  Moderate WBCs seen.           RADIOLOGY  No orders to display       COURSE  Pertinent Labs & Imaging studies reviewed. (See chart for details)    7:04 PM - Patient seen and examined at bedside. Discussed plan of care,    No fever, no tachycardia, no CVA tenderness and not suspect pyelonephritis.  She does have a urinary tract infection and on her pelvic exam there is concerns for possible bacterial vaginitis.  STD evaluation is being done but this is still pending and patient be contacted if this is positive.  Meantime she was started on antibiotics this evening with a prescription to get filled so she can start taking antibiotics tomorrow as outpatient.    Discharged home in stable condition    FINAL IMPRESSION  1. Urinary tract infection without hematuria, site unspecified    2. Bacterial vaginitis        The note accurately reflects work and decisions made by me.  Dylan Chavez D.O.  7/25/2021  8:46 PM

## 2021-07-26 NOTE — ED NOTES
Pt cleared for d/c  dischg instructions given to pt  Verbally understands  Aware that Rx flagyl and bactrim ds was sent to listed pharmacy  She is aware she needs to  med's and take as prescribed  Aware she is to return for worsening symptoms  D/c'ed to home in NAD

## 2021-07-26 NOTE — ED TRIAGE NOTES
"31 yr old female to triage  Chief Complaint   Patient presents with   • Difficulty Urinating     Patient report of urinary urgency this morning after having unprotected sex yesterday and lower back pain with a headache for three days.  Denies any recent trauma. Not sure of any vaginal discharge.    • Back Pain   • Headache     /71   Pulse (!) 102   Temp 36.9 °C (98.5 °F) (Tympanic)   Resp 18   Ht 1.676 m (5' 6\")   Wt 59.1 kg (130 lb 4.7 oz)   LMP  (LMP Unknown)   SpO2 98%   BMI 21.03 kg/m²     "

## 2021-07-26 NOTE — DISCHARGE INSTRUCTIONS
Take antibiotics as prescribed.  No alcohol or sex for 2 weeks.    The test for STDs will take about 24 hours to get for results.  If this is positive you will be contacted.

## 2021-07-26 NOTE — ED NOTES
"Pt given PO med's per order  Snacks also given due to pt having \"nothing in my stomach\" crackers and pudding given   "

## 2021-07-27 LAB
C TRACH DNA SPEC QL NAA+PROBE: POSITIVE
N GONORRHOEA DNA SPEC QL NAA+PROBE: NEGATIVE
SPECIMEN SOURCE: ABNORMAL

## 2021-07-29 RX ORDER — DOXYCYCLINE 100 MG/1
100 CAPSULE ORAL 2 TIMES DAILY
Qty: 14 CAPSULE | Refills: 0 | Status: SHIPPED | OUTPATIENT
Start: 2021-07-29 | End: 2021-08-05

## 2021-07-30 NOTE — ED NOTES
"ED Positive Culture Follow-up/Notification Note:    Date: 7/29/2021     Patient seen in the ED on 7/25/2021 for dysuria, frequency and urgency.   1. Urinary tract infection without hematuria, site unspecified    2. Bacterial vaginitis       Discharge Medication List as of 7/25/2021  9:08 PM      START taking these medications    Details   sulfamethoxazole-trimethoprim (BACTRIM DS) 800-160 MG tablet Take 1 tablet by mouth 2 times a day., Disp-10 tablet, R-0, Normal      metroNIDAZOLE (FLAGYL) 500 MG Tab Take 1 tablet by mouth 3 times a day., Disp-20 tablet, R-0, Normal             Allergies: Patient has no known allergies.     Vitals:    07/25/21 1809 07/25/21 1816 07/25/21 2104   BP:  107/71 115/66   Pulse:  (!) 102 91   Resp:  18 18   Temp:  36.9 °C (98.5 °F) 36.9 °C (98.4 °F)   TempSrc:  Tympanic Temporal   SpO2:  98% 99%   Weight: 59.1 kg (130 lb 4.7 oz)     Height: 1.676 m (5' 6\")        7/25/2021 18:40   Beta-Hcg Urine Negative     Final cultures:   Results     Procedure Component Value Units Date/Time    Chlamydia/GC PCR Urine Or Swab [[id number]]  (Abnormal) Collected: 07/25/21 2016    Order Status: Completed Specimen: Genital Updated: 07/27/21 1437     C. trachomatis by PCR POSITIVE     N. gonorrhoeae by PCR Negative     Source Vaginal    WET PREP [[id number]] Collected: 07/25/21 2016    Order Status: Completed Specimen: Genital from Vaginal Updated: 07/25/21 2031     Significant Indicator NEG     Source GEN     Site VAGINAL     Wet Prep For Parasites No clue cells seen.  No yeast.  Few motile Trichomonas seen.  Moderate WBCs seen.      URINALYSIS (UA) [[id number]]  (Abnormal) Collected: 07/25/21 1840    Order Status: Completed Specimen: Urine Updated: 07/25/21 1859     Color Yellow     Character Cloudy     Specific Gravity 1.020     Ph 6.0     Glucose Negative mg/dL      Ketones Negative mg/dL      Protein Negative mg/dL      Bilirubin Negative     Nitrite Positive     Leukocyte Esterase Moderate     Occult " Blood Moderate     Micro Urine Req Microscopic          Plan:   Patient has not currently been treated for Chlamydia infection. She is being treated for Trichomoniasis with metronidazole; although, she only requires this twice daily. There is no urine culture to assess Bactrim, but Bactrim will provide good empiric coverage for UTI.  I have attempted to contact the patient to inform her of the results and call her in a prescription for doxycycline 100 mg po BID x 7days, but there was no answer. Left a VM to call for results.   I will also send her a letter with this information and notify her that the doxycycline rx has been sent into the [facility] on [id number].      Cristine Altman, PharmD    9554 2021 Patient returned call and was given the above information. She was informed to decrease metronidazole to BID x 7 days and  new rx for doxycycline at [facility] on [address]  I will also prescribe her partner treatment: Jesus Ozuna  1978 Allergies: Unsure of allergies, doesn't believe he has any - Doxycycline 100 mg po BID x 7 days called into the [facility] on [address].    Cristine Altman, VasquezD     ADDENDUM: 2021 1129 Will also call in metronidazole 2 g po x 1 for patient's partner to treat Trichomonas at [facility] on [address] Patient is aware.    Cristine Altman, VasquezD

## 2022-04-05 ENCOUNTER — HOSPITAL ENCOUNTER (OUTPATIENT)
Facility: MEDICAL CENTER | Age: 32
End: 2022-04-05
Payer: MEDICAID

## 2022-04-05 ENCOUNTER — GYNECOLOGY VISIT (OUTPATIENT)
Dept: OBGYN | Facility: CLINIC | Age: 32
End: 2022-04-05
Payer: MEDICAID

## 2022-04-05 ENCOUNTER — TELEPHONE (OUTPATIENT)
Dept: SCHEDULING | Facility: IMAGING CENTER | Age: 32
End: 2022-04-05

## 2022-04-05 ENCOUNTER — HOSPITAL ENCOUNTER (OUTPATIENT)
Dept: LAB | Facility: MEDICAL CENTER | Age: 32
End: 2022-04-05
Payer: MEDICAID

## 2022-04-05 VITALS — WEIGHT: 134.4 LBS | DIASTOLIC BLOOD PRESSURE: 58 MMHG | BODY MASS INDEX: 21.69 KG/M2 | SYSTOLIC BLOOD PRESSURE: 112 MMHG

## 2022-04-05 DIAGNOSIS — Z11.3 SCREEN FOR STD (SEXUALLY TRANSMITTED DISEASE): ICD-10-CM

## 2022-04-05 DIAGNOSIS — Z01.419 WELL WOMAN EXAM WITH ROUTINE GYNECOLOGICAL EXAM: ICD-10-CM

## 2022-04-05 LAB
CANDIDA DNA VAG QL PROBE+SIG AMP: POSITIVE
G VAGINALIS DNA VAG QL PROBE+SIG AMP: NEGATIVE
HBV SURFACE AG SER QL: NORMAL
HCV AB SER QL: NORMAL
HIV 1+2 AB+HIV1 P24 AG SERPL QL IA: NORMAL
T PALLIDUM AB SER QL IA: NORMAL
T VAGINALIS DNA VAG QL PROBE+SIG AMP: NEGATIVE

## 2022-04-05 PROCEDURE — 87491 CHLMYD TRACH DNA AMP PROBE: CPT

## 2022-04-05 PROCEDURE — G0101 CA SCREEN;PELVIC/BREAST EXAM: HCPCS

## 2022-04-05 PROCEDURE — 87591 N.GONORRHOEAE DNA AMP PROB: CPT

## 2022-04-05 PROCEDURE — 36415 COLL VENOUS BLD VENIPUNCTURE: CPT

## 2022-04-05 PROCEDURE — 86803 HEPATITIS C AB TEST: CPT

## 2022-04-05 PROCEDURE — 87624 HPV HI-RISK TYP POOLED RSLT: CPT

## 2022-04-05 PROCEDURE — 87529 HSV DNA AMP PROBE: CPT

## 2022-04-05 PROCEDURE — 88175 CYTOPATH C/V AUTO FLUID REDO: CPT

## 2022-04-05 PROCEDURE — 99395 PREV VISIT EST AGE 18-39: CPT | Mod: 25

## 2022-04-05 PROCEDURE — 87510 GARDNER VAG DNA DIR PROBE: CPT

## 2022-04-05 PROCEDURE — 86780 TREPONEMA PALLIDUM: CPT

## 2022-04-05 PROCEDURE — 87389 HIV-1 AG W/HIV-1&-2 AB AG IA: CPT

## 2022-04-05 PROCEDURE — 87660 TRICHOMONAS VAGIN DIR PROBE: CPT

## 2022-04-05 PROCEDURE — 87340 HEPATITIS B SURFACE AG IA: CPT

## 2022-04-05 PROCEDURE — 87480 CANDIDA DNA DIR PROBE: CPT

## 2022-04-05 ASSESSMENT — FIBROSIS 4 INDEX: FIB4 SCORE: 0.53

## 2022-04-05 NOTE — PROGRESS NOTES
"S:  Esmer Perdomo is a pleasant 31 y.o.  female presents for Annual Well Woman Exam and general STI testing  Patient is currently without complaints. Has had recent sexual contact with 1 male partner, but he has other partners so she is concerned about STI exposure.   Menopausal symptoms: none  Menses: regular q month without intermenstrual spotting., Flow is light, Usually lasting 3 to 4 days      Pt is sexually active with 1 male partner.  Currently using condoms for birth control. Interested in LARC    ROS: Patient is feeling well. No dyspnea or chest pain on exertion. No Abdominal pain, change in bowel habits, black or bloody stools. No urinary sx. GYN ROS:normal menses, no abnormal bleeding, pelvic pain or discharge, no breast pain or new or enlarging lumps on self exam, she complains of some discomfort with penetrative sex but feels it may be due to \"not having a connection\" with her partner. Denies breast tenderness, mass, discharge, changes in size or contour, or abnormal cyclic discomfort. No neurological complaints.      Past Medical History:   Diagnosis Date   • ASCUS of cervix with negative high risk HPV      GYN surgical hx: None  Allergy:   Patient has no known allergies.    LMP:       Patient's last menstrual period was 2022. .     Menstrual History: menses regular every 28 days      Pap history, the patient denies abnormal Pap smears (did have ASCUS pap in 2019) and reports history of chlamydia and trichomoniasis with current partner but both were treated at that time.     Mammo: n/a 32 yo    Objective : The patient appears well, alert and oriented x 3, in no acute distress.  /58 (BP Location: Right arm, Patient Position: Sitting)   Wt 61 kg (134 lb 6.4 oz)   HEENT Exam: EOMI, LORIE, no adenopathy or thyromegaly.  Lungs: Clear to Auscultation   Cor: S1 and S2 normal, no murmurs, or rubs   Abdomen: Soft without tenderness, guarding mass or organomegaly.  Extremities: No " edema, pulses equal  Neurological: Normal No focal signs  Breast Exam:Inspection negative. No nipple discharge or bleeding. No masses or nodularity palpable  Pelvic: External genitalia,urethral meatus, urethra, bladder and vagina normal. Cervix, uterus and adnexa intact and normal.  Perineum normal. Bimanual and rectovaginal without masses or tenderness. Anus with small flesh colored protrusion c/w external hemorrhoid. One area of brighter pink flesh color about 0.5x0.5cm that is c/w variation of hemorrhoid vs anogenital wart.   Pap: Due in July, requests early collection for exam today  Skin: no rash or irregularities; skin tag on left hip in panty line, flesh colored and not irritated, about 1cm x1cm  Lab:No results found for this or any previous visit (from the past 336 hour(s)).    Assessment:    1. well woman  2. STI testing  3. External hemorrhoid with possible anogenital wart  4. Skin tag    Plan:   pap smear, STD/STI testing  counseled on breast self exam, STD prevention, family planning choices and adequate intake of calcium and vitamin D  additional lab tests per [facility] orders  return annually or prn    Contraception: condoms provided, reviewed LARC options - will decide and message this provider to schedule IUD vs nexplanon    Orders Placed This Encounter   • HEP B SURFACE ANTIGEN   • HEP C VIRUS ANTIBODY   • RPR QUAL W/REFLEX   • HSV BY PCR WRFLX TO HSV1/2 SUBTYPE   • HIV-1/HIV-2 SINGLE RESULT   • THINPREP PAP W/HPV AND CTNG   • VAGINAL PATHOGENS DNA PANEL   • Chlamydia & N.gonorrhoeae by PCR - oropharyngeal        MALATHI [email or web address].

## 2022-04-05 NOTE — PATIENT INSTRUCTIONS
Preventive Care 21-39 Years Old, Female  Preventive care refers to visits with your health care provider and lifestyle choices that can promote health and wellness. This includes:  · A yearly physical exam. This may also be called an annual well check.  · Regular dental visits and eye exams.  · Immunizations.  · Screening for certain conditions.  · Healthy lifestyle choices, such as eating a healthy diet, getting regular exercise, not using drugs or products that contain nicotine and tobacco, and limiting alcohol use.  What can I expect for my preventive care visit?  Physical exam  Your health care provider will check your:  · Height and weight. This may be used to calculate body mass index (BMI), which tells if you are at a healthy weight.  · Heart rate and blood pressure.  · Skin for abnormal spots.  Counseling  Your health care provider may ask you questions about your:  · Alcohol, tobacco, and drug use.  · Emotional well-being.  · Home and relationship well-being.  · Sexual activity.  · Eating habits.  · Work and work environment.  · Method of birth control.  · Menstrual cycle.  · Pregnancy history.  What immunizations do I need?    Influenza (flu) vaccine  · This is recommended every year.  Tetanus, diphtheria, and pertussis (Tdap) vaccine  · You may need a Td booster every 10 years.  Varicella (chickenpox) vaccine  · You may need this if you have not been vaccinated.  Human papillomavirus (HPV) vaccine  · If recommended by your health care provider, you may need three doses over 6 months.  Measles, mumps, and rubella (MMR) vaccine  · You may need at least one dose of MMR. You may also need a second dose.  Meningococcal conjugate (MenACWY) vaccine  · One dose is recommended if you are age 19-21 years and a first-year college student living in a residence anderson, or if you have one of several medical conditions. You may also need additional booster doses.  Pneumococcal conjugate (PCV13) vaccine  · You may need  this if you have certain conditions and were not previously vaccinated.  Pneumococcal polysaccharide (PPSV23) vaccine  · You may need one or two doses if you smoke cigarettes or if you have certain conditions.  Hepatitis A vaccine  · You may need this if you have certain conditions or if you travel or work in places where you may be exposed to hepatitis A.  Hepatitis B vaccine  · You may need this if you have certain conditions or if you travel or work in places where you may be exposed to hepatitis B.  Haemophilus influenzae type b (Hib) vaccine  · You may need this if you have certain conditions.  You may receive vaccines as individual doses or as more than one vaccine together in one shot (combination vaccines). Talk with your health care provider about the risks and benefits of combination vaccines.  What tests do I need?    Blood tests  · Lipid and cholesterol levels. These may be checked every 5 years starting at age 20.  · Hepatitis C test.  · Hepatitis B test.  Screening  · Diabetes screening. This is done by checking your blood sugar (glucose) after you have not eaten for a while (fasting).  · Sexually transmitted disease (STD) testing.  · BRCA-related cancer screening. This may be done if you have a family history of breast, ovarian, tubal, or peritoneal cancers.  · Pelvic exam and Pap test. This may be done every 3 years starting at age 21. Starting at age 30, this may be done every 5 years if you have a Pap test in combination with an HPV test.  Talk with your health care provider about your test results, treatment options, and if necessary, the need for more tests.  Follow these instructions at home:  Eating and drinking    · Eat a diet that includes fresh fruits and vegetables, whole grains, lean protein, and low-fat dairy.  · Take vitamin and mineral supplements as recommended by your health care provider.  · Do not drink alcohol if:  ? Your health care provider tells you not to drink.  ? You are  pregnant, may be pregnant, or are planning to become pregnant.  · If you drink alcohol:  ? Limit how much you have to 0-1 drink a day.  ? Be aware of how much alcohol is in your drink. In the [place] one drink equals one 12 oz bottle of beer (355 mL), one 5 oz glass of wine (148 mL), or one 1½ oz glass of hard liquor (44 mL).  Lifestyle  · Take daily care of your teeth and gums.  · Stay active. Exercise for at least 30 minutes on 5 or more days each week.  · Do not use any products that contain nicotine or tobacco, such as cigarettes, e-cigarettes, and chewing tobacco. If you need help quitting, ask your health care provider.  · If you are sexually active, practice safe sex. Use a condom or other form of birth control (contraception) in order to prevent pregnancy and STIs (sexually transmitted infections). If you plan to become pregnant, see your health care provider for a preconception visit.  What's next?  · Visit your health care provider once a year for a well check visit.  · Ask your health care provider how often you should have your eyes and teeth checked.  · Stay up to date on all vaccines.  This information is not intended to replace advice given to you by your health care provider. Make sure you discuss any questions you have with your health care provider.  Document Released: 02/13/2003 Document Revised: 08/29/2019 Document Reviewed: 08/29/2019  Elsevier Patient Education © 2020 [employer]

## 2022-04-05 NOTE — PROGRESS NOTES
GYN visit for STI testing  LMP: 03/16/2022  BC: none   WT: 134.4 lb  BP:112/58  Good # [phone]

## 2022-04-06 ENCOUNTER — TELEPHONE (OUTPATIENT)
Dept: OBGYN | Facility: CLINIC | Age: 32
End: 2022-04-06
Payer: MEDICAID

## 2022-04-06 LAB
C TRACH DNA GENITAL QL NAA+PROBE: NEGATIVE
CYTOLOGY REG CYTOL: NORMAL
HPV HR 12 DNA CVX QL NAA+PROBE: NEGATIVE
HPV16 DNA SPEC QL NAA+PROBE: NEGATIVE
HPV18 DNA SPEC QL NAA+PROBE: NEGATIVE
N GONORRHOEA DNA GENITAL QL NAA+PROBE: NEGATIVE
SPECIMEN SOURCE: NORMAL
SPECIMEN SOURCE: NORMAL

## 2022-04-06 NOTE — TELEPHONE ENCOUNTER
Called pt and informed her the below provider's instructions. Pt stated she saw message through MY Chart and is not having any symptoms.   No further questions.     ----- Message from Miguelina Scott C.N.M. sent at 4/6/2022  7:52 AM PDT -----  Please call to let Esmer know that her vaginal swab showed yeast. If she is having any symptoms like increased discharge, vaginal/vulva itching/burning/pain, or foul vaginal odor, she can use an over the counter yeast product like monistat. Thanks.

## 2022-04-07 RX ORDER — AMOXICILLIN 500 MG/1
TABLET, FILM COATED ORAL
COMMUNITY
Start: 2022-02-01 | End: 2022-04-12

## 2022-04-07 RX ORDER — IBUPROFEN 800 MG/1
TABLET ORAL
COMMUNITY
Start: 2022-02-01 | End: 2022-04-12

## 2022-04-07 RX ORDER — COVID-19 MOLECULAR TEST ASSAY
KIT MISCELLANEOUS
COMMUNITY
Start: 2022-03-29 | End: 2022-04-12

## 2022-04-09 LAB
HSV DNA SPEC QL NAA+PROBE: NOT DETECTED
SPECIMEN SOURCE: NORMAL

## 2022-04-12 ENCOUNTER — OFFICE VISIT (OUTPATIENT)
Dept: MEDICAL GROUP | Facility: MEDICAL CENTER | Age: 32
End: 2022-04-12
Payer: MEDICAID

## 2022-04-12 ENCOUNTER — HOSPITAL ENCOUNTER (OUTPATIENT)
Facility: MEDICAL CENTER | Age: 32
End: 2022-04-12
Attending: FAMILY MEDICINE | Admitting: FAMILY MEDICINE
Payer: MEDICAID

## 2022-04-12 VITALS
SYSTOLIC BLOOD PRESSURE: 110 MMHG | WEIGHT: 134 LBS | HEART RATE: 92 BPM | DIASTOLIC BLOOD PRESSURE: 58 MMHG | TEMPERATURE: 97.3 F | BODY MASS INDEX: 21.53 KG/M2 | RESPIRATION RATE: 16 BRPM | HEIGHT: 66 IN | OXYGEN SATURATION: 97 %

## 2022-04-12 DIAGNOSIS — R87.610 ASCUS OF CERVIX WITH NEGATIVE HIGH RISK HPV: ICD-10-CM

## 2022-04-12 DIAGNOSIS — Z87.828 HISTORY OF EYE TRAUMA: ICD-10-CM

## 2022-04-12 DIAGNOSIS — Z76.89 ENCOUNTER TO ESTABLISH CARE: ICD-10-CM

## 2022-04-12 DIAGNOSIS — L98.9 SKIN LESION: ICD-10-CM

## 2022-04-12 DIAGNOSIS — Z86.59 HISTORY OF ANXIETY: ICD-10-CM

## 2022-04-12 PROBLEM — Z34.80 SUPERVISION OF OTHER NORMAL PREGNANCY: Status: RESOLVED | Noted: 2019-08-22 | Resolved: 2022-04-12

## 2022-04-12 PROBLEM — O99.330 TOBACCO USE AFFECTING PREGNANCY, ANTEPARTUM: Status: RESOLVED | Noted: 2019-07-01 | Resolved: 2022-04-12

## 2022-04-12 LAB — PATHOLOGY CONSULT NOTE: NORMAL

## 2022-04-12 PROCEDURE — 11102 TANGNTL BX SKIN SINGLE LES: CPT | Performed by: FAMILY MEDICINE

## 2022-04-12 PROCEDURE — 700101 HCHG RX REV CODE 250: Performed by: FAMILY MEDICINE

## 2022-04-12 PROCEDURE — 99213 OFFICE O/P EST LOW 20 MIN: CPT | Mod: 25 | Performed by: FAMILY MEDICINE

## 2022-04-12 PROCEDURE — 88304 TISSUE EXAM BY PATHOLOGIST: CPT

## 2022-04-12 RX ADMIN — LIDOCAINE HYDROCHLORIDE 5 ML: 10; .005 INJECTION, SOLUTION EPIDURAL; INFILTRATION; INTRACAUDAL; PERINEURAL at 12:04

## 2022-04-12 ASSESSMENT — FIBROSIS 4 INDEX: FIB4 SCORE: 0.53

## 2022-04-12 NOTE — ASSESSMENT & PLAN NOTE
Patient is reporting a skin tag on the left anterior hip that has been there for her whole life, is requesting removal today.  She reports no growth, no color changes, no pain, no bleeding.

## 2022-04-12 NOTE — ASSESSMENT & PLAN NOTE
Pt reporting doing well without medications. She has been involved spiritually, which has helped her significantly
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
## 2022-09-19 ENCOUNTER — HOSPITAL ENCOUNTER (EMERGENCY)
Facility: MEDICAL CENTER | Age: 32
End: 2022-09-19
Attending: EMERGENCY MEDICINE
Payer: MEDICAID

## 2022-09-19 VITALS
SYSTOLIC BLOOD PRESSURE: 126 MMHG | OXYGEN SATURATION: 91 % | HEART RATE: 107 BPM | TEMPERATURE: 98 F | BODY MASS INDEX: 20.66 KG/M2 | DIASTOLIC BLOOD PRESSURE: 83 MMHG | HEIGHT: 66 IN | RESPIRATION RATE: 15 BRPM | WEIGHT: 128.53 LBS

## 2022-09-19 DIAGNOSIS — R21 RASH: ICD-10-CM

## 2022-09-19 PROCEDURE — 99281 EMR DPT VST MAYX REQ PHY/QHP: CPT

## 2022-09-19 ASSESSMENT — FIBROSIS 4 INDEX: FIB4 SCORE: 0.55

## 2022-09-19 NOTE — ED TRIAGE NOTES
"Chief Complaint   Patient presents with    Rash     X 1 week, concerned has Scabies     /83   Pulse (!) 107   Temp 36.7 °C (98 °F) (Temporal)   Resp 15   Ht 1.676 m (5' 6\")   Wt 58.3 kg (128 lb 8.5 oz)   LMP 09/01/2022 (Approximate)   SpO2 91%   BMI 20.74 kg/m²     Pt ambulated to ED w/ child for concern has Scabies, states now on face.    "

## 2022-09-19 NOTE — DISCHARGE INSTRUCTIONS
Take Motrin and Tylenol as needed for discomfort.  Utilize a good moisturizing cream to the nasal region where you have the fissure.  Take Benadryl as needed for itching.

## 2022-09-19 NOTE — ED PROVIDER NOTES
"ED Provider Note    CHIEF COMPLAINT  Chief Complaint   Patient presents with    Rash     X 1 week, concerned has Scabies       HPI  Esmer Perdomo is a 32 y.o. female who presents Concern for possible scabies.  The patient states that she did recently utilize some cream for suspected scabies.  She states she is having outbreak on her back as well as in the left nasal fold region.  She does not have any difficulty with breathing.  She does not have any associated fevers.  She is unaware of any sick contacts.    REVIEW OF SYSTEMS  No nausea or vomiting, no difficulty with breathing    PHYSICAL EXAM  VITAL SIGNS: /83   Pulse (!) 107   Temp 36.7 °C (98 °F) (Temporal)   Resp 15   Ht 1.676 m (5' 6\")   Wt 58.3 kg (128 lb 8.5 oz)   LMP 09/01/2022 (Approximate)   SpO2 91%   BMI 20.74 kg/m²   In general the patient is anxious and difficult interview    Skin the patient does have a few maculopapular regions as well as a fissure to the left nasal fold.  I do not see any burrows consistent with scabies.  Otherwise do not see any evidence of insects.    Nares and mouth is moist    Neck is supple      COURSE & MEDICAL DECISION MAKING  Pertinent Labs & Imaging studies reviewed. (See chart for details)  This a 32-year-old female who presents with suspected scabies infection.  At this time I will see any evidence of scabies.  The patient's very anxious and frustrated as I do not appreciate the scabies that she clinically is appreciating.  I did offer some permethrin cream but I do not think it is can be effective as I do not see any evidence of the scabies.  The patient.  The patient will be treated with anti-inflammatories and Benadryl as needed.  She is instructed to follow-up with her primary care provider.    FINAL IMPRESSION  1.  Rash         Disposition  The patient will be discharged in stable condition      Electronically signed by: Vidal Escobedo M.D., 9/19/2022 3:53 PM      "

## 2022-11-15 ENCOUNTER — TELEMEDICINE (OUTPATIENT)
Dept: MEDICAL GROUP | Facility: MEDICAL CENTER | Age: 32
End: 2022-11-15
Attending: FAMILY MEDICINE
Payer: MEDICAID

## 2022-11-15 VITALS — BODY MASS INDEX: 20.89 KG/M2 | WEIGHT: 130 LBS | HEIGHT: 66 IN

## 2022-11-15 DIAGNOSIS — Z11.3 ROUTINE SCREENING FOR STI (SEXUALLY TRANSMITTED INFECTION): ICD-10-CM

## 2022-11-15 DIAGNOSIS — Z20.2 EXPOSURE TO TRICHOMONAS: ICD-10-CM

## 2022-11-15 PROCEDURE — 99214 OFFICE O/P EST MOD 30 MIN: CPT | Performed by: FAMILY MEDICINE

## 2022-11-15 ASSESSMENT — FIBROSIS 4 INDEX: FIB4 SCORE: 0.55

## 2022-11-15 NOTE — PROGRESS NOTES
Telemedicine: Established Patient   This evaluation was conducted via Zoom using secure and encrypted videoconferencing technology. The patient was in their home in the [facility].    The patient's identity was confirmed and verbal consent was obtained for this virtual visit.    Subjective:   CC:   Chief Complaint   Patient presents with    Exposure to STD       Esmer Perdomo is a 32 y.o. female presenting for evaluation and management of:    Pt reports she was exposed to someone who had trichomonas and would like to get full panel of STIs. Exposed a few days ago   She reports maybe some change in vaginal discharge, but unable to be specific. No other itching, dysuria, pain. She reports some pelvic pain for a couple of days. No fevers or chills. Pain is about 2/10, across the lower belly.           No Known Allergies    Current medicines (including changes today)  No current outpatient medications on file.     No current facility-administered medications for this visit.       Patient Active Problem List    Diagnosis Date Noted    Skin lesion 04/12/2022    History of eye trauma 04/12/2022    ASCUS of cervix with negative high risk HPV 07/08/2019    History of anxiety 07/01/2019       Family History   Problem Relation Age of Onset    Diabetes Father     COPD Father     Heart Disease Maternal Grandfather         CABG    Hyperlipidemia Maternal Grandfather        She  has a past medical history of ASCUS of cervix with negative high risk HPV.    She has no past medical history of Addisons disease (HCC), Adrenal disorder (HCC), Allergy, Anemia, Anginal syndrome (HCC), Anxiety, Arrhythmia, Arthritis, Asthma, At risk for sleep apnea, Back pain, Blood clotting disorder ([facility]), Blood transfusion without reported diagnosis, BRCA1 negative, BRCA1 positive, BRCA2 gene mutation positive, BRCA2 negative, Breast cancer (HCC), Bronchitis, Cancer (HCC), Cancer of peritoneum (HCC), Cataract, Congestive heart failure  "(HCC), COPD (chronic obstructive pulmonary disease) (HCC), Cushings syndrome (HCC), Depression, Diabetes (HCC), Diabetic neuropathy (HCC), Dialysis patient (HCC), Disorder of thyroid, Fall, GERD (gastroesophageal reflux disease), Glaucoma, Goiter, Head ache, Heart murmur, Heart valve disease, Hemorrhagic disorder (HCC), HIV (human immunodeficiency virus infection) (HCC), Hyperlipidemia, Hypertension, IBD (inflammatory bowel disease), Indigestion, Infectious disease, Jaundice, Malignant neoplasm of fallopian tube (HCC), Meningitis, Migraine, Muscle disorder, Myocardial infarct (HCC), Osteoporosis, Ovarian cancer (HCC), Pacemaker, Parathyroid disorder (HCC), Pituitary disease (HCC), Pneumonia, Psychiatric problem, Pulmonary emphysema (HCC), Renal disorder, Rheumatic fever, Seizure (HCC), Sickle cell disease (HCC), Stroke (HCC), Substance abuse (HCC), Tuberculosis, Urinary incontinence, or Urinary tract infection.  She  has a past surgical history that includes other orthopedic surgery (Right) and eye surgery (Right).       Objective:   Ht 1.676 m (5' 6\")   Wt 59 kg (130 lb)   BMI 20.98 kg/m²     Physical Exam  Constitutional: Alert, no distress, well-groomed.  Respiratory: Unlabored respiratory effort, no cough.  MSK: moves all extremities.  Psych: Alert and oriented x3, normal affect and mood.    Labs:  None new      Assessment and Plan:   The following treatment plan was discussed:     1. Routine screening for STI (sexually transmitted infection)  - T.PALLIDUM AB MATT (SCREENING); Future  - HIV AG/AB COMBO ASSAY SCREENING; Future  - HEPATITIS PANEL ACUTE(4 COMPONENTS); Future  - Chlamydia/GC, PCR (Urine); Future    2. Exposure to trichomonas  - VAGINAL PATHOGENS DNA PANEL; Future      Follow-up: Return if symptoms worsen or fail to improve.         "

## 2022-11-16 ENCOUNTER — HOSPITAL ENCOUNTER (OUTPATIENT)
Facility: MEDICAL CENTER | Age: 32
End: 2022-11-16
Attending: FAMILY MEDICINE
Payer: MEDICAID

## 2022-11-16 DIAGNOSIS — Z20.2 EXPOSURE TO TRICHOMONAS: ICD-10-CM

## 2022-11-16 PROCEDURE — 87480 CANDIDA DNA DIR PROBE: CPT

## 2022-11-16 PROCEDURE — 87660 TRICHOMONAS VAGIN DIR PROBE: CPT

## 2022-11-16 PROCEDURE — 87510 GARDNER VAG DNA DIR PROBE: CPT

## 2022-11-17 LAB
AMBIGUOUS DTTM AMBI4: NORMAL
CANDIDA DNA VAG QL PROBE+SIG AMP: POSITIVE
G VAGINALIS DNA VAG QL PROBE+SIG AMP: POSITIVE
SIGNIFICANT IND 70042: NORMAL
SITE SITE: NORMAL
SOURCE SOURCE: NORMAL
T VAGINALIS DNA VAG QL PROBE+SIG AMP: NEGATIVE

## 2022-11-19 DIAGNOSIS — B37.31 VAGINAL CANDIDA: ICD-10-CM

## 2022-11-19 DIAGNOSIS — B96.89 BV (BACTERIAL VAGINOSIS): ICD-10-CM

## 2022-11-19 DIAGNOSIS — N76.0 BV (BACTERIAL VAGINOSIS): ICD-10-CM

## 2022-11-19 RX ORDER — METRONIDAZOLE 500 MG/1
500 TABLET ORAL 2 TIMES DAILY
Qty: 14 TABLET | Refills: 0 | Status: SHIPPED | OUTPATIENT
Start: 2022-11-19 | End: 2022-11-26

## 2022-11-19 RX ORDER — FLUCONAZOLE 150 MG/1
150 TABLET ORAL DAILY
Qty: 1 TABLET | Refills: 0 | Status: SHIPPED | OUTPATIENT
Start: 2022-11-19 | End: 2023-06-22

## 2022-12-08 ENCOUNTER — HOSPITAL ENCOUNTER (OUTPATIENT)
Dept: LAB | Facility: MEDICAL CENTER | Age: 32
End: 2022-12-08
Attending: FAMILY MEDICINE
Payer: MEDICAID

## 2022-12-08 DIAGNOSIS — Z11.3 ROUTINE SCREENING FOR STI (SEXUALLY TRANSMITTED INFECTION): ICD-10-CM

## 2022-12-08 LAB
HAV IGM SERPL QL IA: NORMAL
HBV CORE IGM SER QL: NORMAL
HBV SURFACE AG SER QL: NORMAL
HCV AB SER QL: NORMAL
HIV 1+2 AB+HIV1 P24 AG SERPL QL IA: NORMAL
T PALLIDUM AB SER QL IA: NORMAL

## 2022-12-08 PROCEDURE — 87389 HIV-1 AG W/HIV-1&-2 AB AG IA: CPT

## 2022-12-08 PROCEDURE — 80074 ACUTE HEPATITIS PANEL: CPT

## 2022-12-08 PROCEDURE — 86780 TREPONEMA PALLIDUM: CPT

## 2022-12-08 PROCEDURE — 36415 COLL VENOUS BLD VENIPUNCTURE: CPT

## 2022-12-13 DIAGNOSIS — Z11.3 ROUTINE SCREENING FOR STI (SEXUALLY TRANSMITTED INFECTION): ICD-10-CM

## 2023-03-21 DIAGNOSIS — Z11.3 ROUTINE SCREENING FOR STI (SEXUALLY TRANSMITTED INFECTION): ICD-10-CM

## 2023-03-29 DIAGNOSIS — Z87.828 HISTORY OF EYE TRAUMA: ICD-10-CM

## 2023-04-07 ENCOUNTER — HOSPITAL ENCOUNTER (OUTPATIENT)
Dept: RADIOLOGY | Facility: MEDICAL CENTER | Age: 33
End: 2023-04-07
Attending: FAMILY MEDICINE
Payer: MEDICAID

## 2023-04-07 DIAGNOSIS — Z87.828 HISTORY OF EYE TRAUMA: ICD-10-CM

## 2023-04-07 PROCEDURE — 70486 CT MAXILLOFACIAL W/O DYE: CPT

## 2023-04-11 ENCOUNTER — HOSPITAL ENCOUNTER (OUTPATIENT)
Dept: LAB | Facility: MEDICAL CENTER | Age: 33
End: 2023-04-11
Attending: FAMILY MEDICINE
Payer: MEDICAID

## 2023-04-11 DIAGNOSIS — Z11.3 ROUTINE SCREENING FOR STI (SEXUALLY TRANSMITTED INFECTION): ICD-10-CM

## 2023-04-11 PROCEDURE — 87389 HIV-1 AG W/HIV-1&-2 AB AG IA: CPT

## 2023-04-11 PROCEDURE — 80074 ACUTE HEPATITIS PANEL: CPT

## 2023-04-11 PROCEDURE — 87491 CHLMYD TRACH DNA AMP PROBE: CPT

## 2023-04-11 PROCEDURE — 87591 N.GONORRHOEAE DNA AMP PROB: CPT

## 2023-04-11 PROCEDURE — 86780 TREPONEMA PALLIDUM: CPT

## 2023-04-11 PROCEDURE — 36415 COLL VENOUS BLD VENIPUNCTURE: CPT

## 2023-04-12 LAB
C TRACH DNA SPEC QL NAA+PROBE: NEGATIVE
HAV IGM SERPL QL IA: NORMAL
HBV CORE IGM SER QL: NORMAL
HBV SURFACE AG SER QL: NORMAL
HCV AB SER QL: NORMAL
HIV 1+2 AB+HIV1 P24 AG SERPL QL IA: NORMAL
N GONORRHOEA DNA SPEC QL NAA+PROBE: NEGATIVE
SPECIMEN SOURCE: NORMAL
T PALLIDUM AB SER QL IA: NORMAL

## 2023-06-01 ENCOUNTER — HOSPITAL ENCOUNTER (OUTPATIENT)
Dept: LAB | Facility: MEDICAL CENTER | Age: 33
End: 2023-06-01
Attending: FAMILY MEDICINE
Payer: MEDICAID

## 2023-06-01 DIAGNOSIS — Z11.3 ROUTINE SCREENING FOR STI (SEXUALLY TRANSMITTED INFECTION): ICD-10-CM

## 2023-06-01 PROCEDURE — 87591 N.GONORRHOEAE DNA AMP PROB: CPT

## 2023-06-01 PROCEDURE — 87491 CHLMYD TRACH DNA AMP PROBE: CPT

## 2023-06-03 LAB
C TRACH DNA SPEC QL NAA+PROBE: NEGATIVE
N GONORRHOEA DNA SPEC QL NAA+PROBE: NEGATIVE
SPECIMEN SOURCE: NORMAL

## 2023-06-22 ENCOUNTER — HOSPITAL ENCOUNTER (OUTPATIENT)
Facility: MEDICAL CENTER | Age: 33
End: 2023-06-22
Attending: NURSE PRACTITIONER
Payer: MEDICAID

## 2023-06-22 ENCOUNTER — OFFICE VISIT (OUTPATIENT)
Dept: URGENT CARE | Facility: CLINIC | Age: 33
End: 2023-06-22
Payer: MEDICAID

## 2023-06-22 VITALS
HEART RATE: 97 BPM | OXYGEN SATURATION: 100 % | BODY MASS INDEX: 21.13 KG/M2 | WEIGHT: 131.5 LBS | RESPIRATION RATE: 16 BRPM | HEIGHT: 66 IN | TEMPERATURE: 97.3 F | SYSTOLIC BLOOD PRESSURE: 128 MMHG | DIASTOLIC BLOOD PRESSURE: 70 MMHG

## 2023-06-22 DIAGNOSIS — Z72.51 HIGH RISK HETEROSEXUAL BEHAVIOR: ICD-10-CM

## 2023-06-22 DIAGNOSIS — N89.8 VAGINAL DISCHARGE: ICD-10-CM

## 2023-06-22 LAB
APPEARANCE UR: NORMAL
BILIRUB UR STRIP-MCNC: NEGATIVE MG/DL
COLOR UR AUTO: NORMAL
GLUCOSE UR STRIP.AUTO-MCNC: NEGATIVE MG/DL
KETONES UR STRIP.AUTO-MCNC: NEGATIVE MG/DL
LEUKOCYTE ESTERASE UR QL STRIP.AUTO: NORMAL
NITRITE UR QL STRIP.AUTO: POSITIVE
PH UR STRIP.AUTO: 6 [PH] (ref 5–8)
PROT UR QL STRIP: NEGATIVE MG/DL
RBC UR QL AUTO: NEGATIVE
SP GR UR STRIP.AUTO: 1.02
UROBILINOGEN UR STRIP-MCNC: NORMAL MG/DL

## 2023-06-22 PROCEDURE — 3074F SYST BP LT 130 MM HG: CPT | Performed by: NURSE PRACTITIONER

## 2023-06-22 PROCEDURE — 87491 CHLMYD TRACH DNA AMP PROBE: CPT

## 2023-06-22 PROCEDURE — 87186 SC STD MICRODIL/AGAR DIL: CPT

## 2023-06-22 PROCEDURE — 3078F DIAST BP <80 MM HG: CPT | Performed by: NURSE PRACTITIONER

## 2023-06-22 PROCEDURE — 87591 N.GONORRHOEAE DNA AMP PROB: CPT

## 2023-06-22 PROCEDURE — 87510 GARDNER VAG DNA DIR PROBE: CPT

## 2023-06-22 PROCEDURE — 81002 URINALYSIS NONAUTO W/O SCOPE: CPT | Performed by: NURSE PRACTITIONER

## 2023-06-22 PROCEDURE — 87077 CULTURE AEROBIC IDENTIFY: CPT

## 2023-06-22 PROCEDURE — 87480 CANDIDA DNA DIR PROBE: CPT

## 2023-06-22 PROCEDURE — 87660 TRICHOMONAS VAGIN DIR PROBE: CPT

## 2023-06-22 PROCEDURE — 87086 URINE CULTURE/COLONY COUNT: CPT

## 2023-06-22 PROCEDURE — 99203 OFFICE O/P NEW LOW 30 MIN: CPT | Performed by: NURSE PRACTITIONER

## 2023-06-22 RX ORDER — DOXYCYCLINE HYCLATE 100 MG
100 TABLET ORAL 2 TIMES DAILY
Qty: 14 TABLET | Refills: 0 | Status: SHIPPED | OUTPATIENT
Start: 2023-06-22 | End: 2023-06-29

## 2023-06-22 RX ORDER — FLUCONAZOLE 150 MG/1
150 TABLET ORAL DAILY
Qty: 2 TABLET | Refills: 0 | Status: SHIPPED | OUTPATIENT
Start: 2023-06-22 | End: 2024-01-25

## 2023-06-22 ASSESSMENT — ENCOUNTER SYMPTOMS
CHILLS: 0
BACK PAIN: 0
VOMITING: 0
FLANK PAIN: 0
FEVER: 0
SWEATS: 0

## 2023-06-23 DIAGNOSIS — B96.89 BV (BACTERIAL VAGINOSIS): ICD-10-CM

## 2023-06-23 DIAGNOSIS — N76.0 BV (BACTERIAL VAGINOSIS): ICD-10-CM

## 2023-06-23 LAB
C TRACH DNA GENITAL QL NAA+PROBE: NEGATIVE
CANDIDA DNA VAG QL PROBE+SIG AMP: POSITIVE
G VAGINALIS DNA VAG QL PROBE+SIG AMP: POSITIVE
N GONORRHOEA DNA GENITAL QL NAA+PROBE: NEGATIVE
SPECIMEN SOURCE: NORMAL
T VAGINALIS DNA VAG QL PROBE+SIG AMP: NEGATIVE

## 2023-06-23 RX ORDER — METRONIDAZOLE 500 MG/1
500 TABLET ORAL 2 TIMES DAILY
Qty: 14 TABLET | Refills: 0 | Status: SHIPPED | OUTPATIENT
Start: 2023-06-23 | End: 2023-06-30
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
## 2023-06-24 DIAGNOSIS — Z11.3 SCREENING EXAMINATION FOR STD (SEXUALLY TRANSMITTED DISEASE): ICD-10-CM

## 2023-06-24 NOTE — RESULT ENCOUNTER NOTE
Spoke with patient and informed her of her results. Pt would like to know if lab work will be sent for STD screening.

## 2023-06-25 LAB
BACTERIA UR CULT: ABNORMAL
BACTERIA UR CULT: ABNORMAL
SIGNIFICANT IND 70042: ABNORMAL
SITE SITE: ABNORMAL
SOURCE SOURCE: ABNORMAL

## 2023-07-26 NOTE — ED NOTES
Dexamethasone injection given today. Work excuse given for today and tomorrow. Encouraged rest today and tomorrow. Tylenol/ibuprofen, heat/ice as needed. Encouraged patient to start PT as previously discussed. Follow up with PCP and/or neurology. Pt providing urine sample at this time.

## 2023-10-03 ENCOUNTER — APPOINTMENT (OUTPATIENT)
Dept: URGENT CARE | Facility: PHYSICIAN GROUP | Age: 33
End: 2023-10-03
Payer: MEDICAID

## 2024-01-18 SDOH — ECONOMIC STABILITY: HOUSING INSECURITY: IN THE LAST 12 MONTHS, HOW MANY PLACES HAVE YOU LIVED?: 1

## 2024-01-18 SDOH — HEALTH STABILITY: PHYSICAL HEALTH: ON AVERAGE, HOW MANY MINUTES DO YOU ENGAGE IN EXERCISE AT THIS LEVEL?: 120 MIN

## 2024-01-18 SDOH — ECONOMIC STABILITY: INCOME INSECURITY: IN THE LAST 12 MONTHS, WAS THERE A TIME WHEN YOU WERE NOT ABLE TO PAY THE MORTGAGE OR RENT ON TIME?: YES

## 2024-01-18 SDOH — ECONOMIC STABILITY: TRANSPORTATION INSECURITY
IN THE PAST 12 MONTHS, HAS THE LACK OF TRANSPORTATION KEPT YOU FROM MEDICAL APPOINTMENTS OR FROM GETTING MEDICATIONS?: NO

## 2024-01-18 SDOH — ECONOMIC STABILITY: FOOD INSECURITY: WITHIN THE PAST 12 MONTHS, THE FOOD YOU BOUGHT JUST DIDN'T LAST AND YOU DIDN'T HAVE MONEY TO GET MORE.: SOMETIMES TRUE

## 2024-01-18 SDOH — ECONOMIC STABILITY: FOOD INSECURITY: WITHIN THE PAST 12 MONTHS, YOU WORRIED THAT YOUR FOOD WOULD RUN OUT BEFORE YOU GOT MONEY TO BUY MORE.: SOMETIMES TRUE

## 2024-01-18 SDOH — ECONOMIC STABILITY: HOUSING INSECURITY
IN THE LAST 12 MONTHS, WAS THERE A TIME WHEN YOU DID NOT HAVE A STEADY PLACE TO SLEEP OR SLEPT IN A SHELTER (INCLUDING NOW)?: NO

## 2024-01-18 SDOH — ECONOMIC STABILITY: INCOME INSECURITY: HOW HARD IS IT FOR YOU TO PAY FOR THE VERY BASICS LIKE FOOD, HOUSING, MEDICAL CARE, AND HEATING?: NOT VERY HARD

## 2024-01-18 SDOH — ECONOMIC STABILITY: TRANSPORTATION INSECURITY
IN THE PAST 12 MONTHS, HAS LACK OF TRANSPORTATION KEPT YOU FROM MEETINGS, WORK, OR FROM GETTING THINGS NEEDED FOR DAILY LIVING?: NO

## 2024-01-18 SDOH — HEALTH STABILITY: PHYSICAL HEALTH: ON AVERAGE, HOW MANY DAYS PER WEEK DO YOU ENGAGE IN MODERATE TO STRENUOUS EXERCISE (LIKE A BRISK WALK)?: 3 DAYS

## 2024-01-18 ASSESSMENT — LIFESTYLE VARIABLES
HOW OFTEN DO YOU HAVE SIX OR MORE DRINKS ON ONE OCCASION: NEVER
HOW OFTEN DO YOU HAVE A DRINK CONTAINING ALCOHOL: 2-4 TIMES A MONTH
SKIP TO QUESTIONS 9-10: 1
HOW MANY STANDARD DRINKS CONTAINING ALCOHOL DO YOU HAVE ON A TYPICAL DAY: 1 OR 2
AUDIT-C TOTAL SCORE: 2

## 2024-01-18 ASSESSMENT — SOCIAL DETERMINANTS OF HEALTH (SDOH)
HOW OFTEN DO YOU GET TOGETHER WITH FRIENDS OR RELATIVES?: NEVER
DO YOU BELONG TO ANY CLUBS OR ORGANIZATIONS SUCH AS CHURCH GROUPS UNIONS, FRATERNAL OR ATHLETIC GROUPS, OR SCHOOL GROUPS?: NO
HOW OFTEN DO YOU ATTENT MEETINGS OF THE CLUB OR ORGANIZATION YOU BELONG TO?: NEVER
IN A TYPICAL WEEK, HOW MANY TIMES DO YOU TALK ON THE PHONE WITH FAMILY, FRIENDS, OR NEIGHBORS?: MORE THAN THREE TIMES A WEEK
HOW OFTEN DO YOU ATTEND CHURCH OR RELIGIOUS SERVICES?: 1 TO 4 TIMES PER YEAR

## 2024-01-23 SDOH — HEALTH STABILITY: PHYSICAL HEALTH: ON AVERAGE, HOW MANY MINUTES DO YOU ENGAGE IN EXERCISE AT THIS LEVEL?: 120 MIN

## 2024-01-23 SDOH — HEALTH STABILITY: MENTAL HEALTH
STRESS IS WHEN SOMEONE FEELS TENSE, NERVOUS, ANXIOUS, OR CAN'T SLEEP AT NIGHT BECAUSE THEIR MIND IS TROUBLED. HOW STRESSED ARE YOU?: TO SOME EXTENT

## 2024-01-23 SDOH — ECONOMIC STABILITY: HOUSING INSECURITY
IN THE LAST 12 MONTHS, WAS THERE A TIME WHEN YOU DID NOT HAVE A STEADY PLACE TO SLEEP OR SLEPT IN A SHELTER (INCLUDING NOW)?: YES

## 2024-01-23 SDOH — HEALTH STABILITY: PHYSICAL HEALTH: ON AVERAGE, HOW MANY DAYS PER WEEK DO YOU ENGAGE IN MODERATE TO STRENUOUS EXERCISE (LIKE A BRISK WALK)?: 3 DAYS

## 2024-01-23 SDOH — ECONOMIC STABILITY: HOUSING INSECURITY: IN THE LAST 12 MONTHS, HOW MANY PLACES HAVE YOU LIVED?: 1

## 2024-01-23 SDOH — ECONOMIC STABILITY: TRANSPORTATION INSECURITY
IN THE PAST 12 MONTHS, HAS LACK OF RELIABLE TRANSPORTATION KEPT YOU FROM MEDICAL APPOINTMENTS, MEETINGS, WORK OR FROM GETTING THINGS NEEDED FOR DAILY LIVING?: NO

## 2024-01-23 ASSESSMENT — SOCIAL DETERMINANTS OF HEALTH (SDOH)
HOW HARD IS IT FOR YOU TO PAY FOR THE VERY BASICS LIKE FOOD, HOUSING, MEDICAL CARE, AND HEATING?: NOT VERY HARD
WITHIN THE PAST 12 MONTHS, YOU WORRIED THAT YOUR FOOD WOULD RUN OUT BEFORE YOU GOT THE MONEY TO BUY MORE: SOMETIMES TRUE
HOW OFTEN DO YOU HAVE SIX OR MORE DRINKS ON ONE OCCASION: NEVER
HOW OFTEN DO YOU HAVE A DRINK CONTAINING ALCOHOL: 2-4 TIMES A MONTH
HOW OFTEN DO YOU ATTENT MEETINGS OF THE CLUB OR ORGANIZATION YOU BELONG TO?: NEVER
DO YOU BELONG TO ANY CLUBS OR ORGANIZATIONS SUCH AS CHURCH GROUPS UNIONS, FRATERNAL OR ATHLETIC GROUPS, OR SCHOOL GROUPS?: NO
HOW OFTEN DO YOU ATTEND CHURCH OR RELIGIOUS SERVICES?: 1 TO 4 TIMES PER YEAR
IN A TYPICAL WEEK, HOW MANY TIMES DO YOU TALK ON THE PHONE WITH FAMILY, FRIENDS, OR NEIGHBORS?: MORE THAN THREE TIMES A WEEK
HOW MANY DRINKS CONTAINING ALCOHOL DO YOU HAVE ON A TYPICAL DAY WHEN YOU ARE DRINKING: 1 OR 2
HOW OFTEN DO YOU GET TOGETHER WITH FRIENDS OR RELATIVES?: NEVER

## 2024-01-24 ENCOUNTER — APPOINTMENT (OUTPATIENT)
Dept: MEDICAL GROUP | Facility: MEDICAL CENTER | Age: 34
End: 2024-01-24
Payer: MEDICAID

## 2024-01-25 ENCOUNTER — APPOINTMENT (OUTPATIENT)
Dept: RADIOLOGY | Facility: MEDICAL CENTER | Age: 34
End: 2024-01-25
Attending: EMERGENCY MEDICINE
Payer: MEDICAID

## 2024-01-25 ENCOUNTER — HOSPITAL ENCOUNTER (EMERGENCY)
Facility: MEDICAL CENTER | Age: 34
End: 2024-01-25
Attending: EMERGENCY MEDICINE
Payer: MEDICAID

## 2024-01-25 VITALS
OXYGEN SATURATION: 99 % | SYSTOLIC BLOOD PRESSURE: 116 MMHG | HEART RATE: 98 BPM | BODY MASS INDEX: 22.52 KG/M2 | DIASTOLIC BLOOD PRESSURE: 68 MMHG | TEMPERATURE: 97.7 F | RESPIRATION RATE: 20 BRPM | WEIGHT: 139.55 LBS

## 2024-01-25 DIAGNOSIS — R07.89 CHEST PRESSURE: ICD-10-CM

## 2024-01-25 DIAGNOSIS — R00.2 PALPITATIONS: ICD-10-CM

## 2024-01-25 LAB
ALBUMIN SERPL BCP-MCNC: 4.3 G/DL (ref 3.2–4.9)
ALBUMIN/GLOB SERPL: 1.4 G/DL
ALP SERPL-CCNC: 71 U/L (ref 30–99)
ALT SERPL-CCNC: 29 U/L (ref 2–50)
ANION GAP SERPL CALC-SCNC: 11 MMOL/L (ref 7–16)
AST SERPL-CCNC: 20 U/L (ref 12–45)
BASOPHILS # BLD AUTO: 0.7 % (ref 0–1.8)
BASOPHILS # BLD: 0.04 K/UL (ref 0–0.12)
BILIRUB SERPL-MCNC: 0.4 MG/DL (ref 0.1–1.5)
BUN SERPL-MCNC: 19 MG/DL (ref 8–22)
CALCIUM ALBUM COR SERPL-MCNC: 9.2 MG/DL (ref 8.5–10.5)
CALCIUM SERPL-MCNC: 9.4 MG/DL (ref 8.4–10.2)
CHLORIDE SERPL-SCNC: 103 MMOL/L (ref 96–112)
CO2 SERPL-SCNC: 26 MMOL/L (ref 20–33)
CREAT SERPL-MCNC: 0.57 MG/DL (ref 0.5–1.4)
D DIMER PPP IA.FEU-MCNC: 0.29 UG/ML (FEU) (ref 0–0.5)
EKG IMPRESSION: NORMAL
EOSINOPHIL # BLD AUTO: 0.1 K/UL (ref 0–0.51)
EOSINOPHIL NFR BLD: 1.7 % (ref 0–6.9)
ERYTHROCYTE [DISTWIDTH] IN BLOOD BY AUTOMATED COUNT: 41.2 FL (ref 35.9–50)
GFR SERPLBLD CREATININE-BSD FMLA CKD-EPI: 121 ML/MIN/1.73 M 2
GLOBULIN SER CALC-MCNC: 3 G/DL (ref 1.9–3.5)
GLUCOSE SERPL-MCNC: 80 MG/DL (ref 65–99)
HCG SERPL QL: NEGATIVE
HCT VFR BLD AUTO: 41.4 % (ref 37–47)
HGB BLD-MCNC: 14.4 G/DL (ref 12–16)
IMM GRANULOCYTES # BLD AUTO: 0.01 K/UL (ref 0–0.11)
IMM GRANULOCYTES NFR BLD AUTO: 0.2 % (ref 0–0.9)
LIPASE SERPL-CCNC: 40 U/L (ref 11–82)
LYMPHOCYTES # BLD AUTO: 2.33 K/UL (ref 1–4.8)
LYMPHOCYTES NFR BLD: 39.7 % (ref 22–41)
MCH RBC QN AUTO: 34.1 PG (ref 27–33)
MCHC RBC AUTO-ENTMCNC: 34.8 G/DL (ref 32.2–35.5)
MCV RBC AUTO: 98.1 FL (ref 81.4–97.8)
MONOCYTES # BLD AUTO: 0.59 K/UL (ref 0–0.85)
MONOCYTES NFR BLD AUTO: 10.1 % (ref 0–13.4)
NEUTROPHILS # BLD AUTO: 2.8 K/UL (ref 1.82–7.42)
NEUTROPHILS NFR BLD: 47.6 % (ref 44–72)
NRBC # BLD AUTO: 0 K/UL
NRBC BLD-RTO: 0 /100 WBC (ref 0–0.2)
PLATELET # BLD AUTO: 307 K/UL (ref 164–446)
PMV BLD AUTO: 8.5 FL (ref 9–12.9)
POTASSIUM SERPL-SCNC: 3.9 MMOL/L (ref 3.6–5.5)
PROT SERPL-MCNC: 7.3 G/DL (ref 6–8.2)
RBC # BLD AUTO: 4.22 M/UL (ref 4.2–5.4)
SODIUM SERPL-SCNC: 140 MMOL/L (ref 135–145)
TROPONIN T SERPL-MCNC: <6 NG/L (ref 6–19)
WBC # BLD AUTO: 5.9 K/UL (ref 4.8–10.8)

## 2024-01-25 PROCEDURE — 93005 ELECTROCARDIOGRAM TRACING: CPT

## 2024-01-25 PROCEDURE — 85379 FIBRIN DEGRADATION QUANT: CPT

## 2024-01-25 PROCEDURE — 71045 X-RAY EXAM CHEST 1 VIEW: CPT

## 2024-01-25 PROCEDURE — 85025 COMPLETE CBC W/AUTO DIFF WBC: CPT

## 2024-01-25 PROCEDURE — 84484 ASSAY OF TROPONIN QUANT: CPT

## 2024-01-25 PROCEDURE — 99284 EMERGENCY DEPT VISIT MOD MDM: CPT

## 2024-01-25 PROCEDURE — 84703 CHORIONIC GONADOTROPIN ASSAY: CPT

## 2024-01-25 PROCEDURE — 93005 ELECTROCARDIOGRAM TRACING: CPT | Performed by: EMERGENCY MEDICINE

## 2024-01-25 PROCEDURE — 83690 ASSAY OF LIPASE: CPT

## 2024-01-25 PROCEDURE — 36415 COLL VENOUS BLD VENIPUNCTURE: CPT

## 2024-01-25 PROCEDURE — 80053 COMPREHEN METABOLIC PANEL: CPT

## 2024-01-25 PROCEDURE — 94760 N-INVAS EAR/PLS OXIMETRY 1: CPT

## 2024-01-25 RX ORDER — ASPIRIN 325 MG
650 TABLET ORAL ONCE
COMMUNITY

## 2024-01-25 ASSESSMENT — HEART SCORE
HEART SCORE: 2
HISTORY: SLIGHTLY SUSPICIOUS
TROPONIN: LESS THAN OR EQUAL TO NORMAL LIMIT
RISK FACTORS: 1-2 RISK FACTORS
ECG: NON-SPECIFIC REPOLARIZATION DISTURBANCE
AGE: <45

## 2024-01-25 ASSESSMENT — PAIN DESCRIPTION - DESCRIPTORS: DESCRIPTORS: ACHING

## 2024-01-25 NOTE — ED NOTES
Discharge instructions given and discussed.  Pt educated to come back to ER for new or worsening symptoms and follow up with cardiologist as instructed. Pt verbalized understanding. VSS. Pt  Discharged in stable condition.

## 2024-01-25 NOTE — ED TRIAGE NOTES
"Chief Complaint   Patient presents with    Arm Pain           Chest Pressure      Pt complains of  bilateral arm pressure, and chest pressure for the past 2 days. Pt states she feels \" flutters\" in her heart.   "
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
## 2024-01-30 ENCOUNTER — HOSPITAL ENCOUNTER (EMERGENCY)
Facility: MEDICAL CENTER | Age: 34
End: 2024-01-30
Attending: STUDENT IN AN ORGANIZED HEALTH CARE EDUCATION/TRAINING PROGRAM
Payer: MEDICAID

## 2024-01-30 VITALS
WEIGHT: 130 LBS | BODY MASS INDEX: 20.89 KG/M2 | DIASTOLIC BLOOD PRESSURE: 81 MMHG | HEIGHT: 66 IN | SYSTOLIC BLOOD PRESSURE: 145 MMHG | OXYGEN SATURATION: 99 % | HEART RATE: 102 BPM | TEMPERATURE: 98.5 F | RESPIRATION RATE: 20 BRPM

## 2024-01-30 DIAGNOSIS — F15.90 AMPHETAMINE USE: ICD-10-CM

## 2024-01-30 LAB
AMPHET UR QL SCN: POSITIVE
BARBITURATES UR QL SCN: NEGATIVE
BENZODIAZ UR QL SCN: NEGATIVE
BZE UR QL SCN: NEGATIVE
CANNABINOIDS UR QL SCN: POSITIVE
FENTANYL UR QL: NEGATIVE
HCG UR QL: NEGATIVE
METHADONE UR QL SCN: NEGATIVE
OPIATES UR QL SCN: NEGATIVE
OXYCODONE UR QL SCN: NEGATIVE
PCP UR QL SCN: NEGATIVE
PROPOXYPH UR QL SCN: NEGATIVE
T PALLIDUM AB SER QL IA: NORMAL

## 2024-01-30 PROCEDURE — 81025 URINE PREGNANCY TEST: CPT

## 2024-01-30 PROCEDURE — 36415 COLL VENOUS BLD VENIPUNCTURE: CPT

## 2024-01-30 PROCEDURE — 87491 CHLMYD TRACH DNA AMP PROBE: CPT

## 2024-01-30 PROCEDURE — 86780 TREPONEMA PALLIDUM: CPT

## 2024-01-30 PROCEDURE — 87591 N.GONORRHOEAE DNA AMP PROB: CPT

## 2024-01-30 PROCEDURE — 99284 EMERGENCY DEPT VISIT MOD MDM: CPT

## 2024-01-30 PROCEDURE — 80307 DRUG TEST PRSMV CHEM ANLYZR: CPT

## 2024-01-30 ASSESSMENT — FIBROSIS 4 INDEX: FIB4 SCORE: 0.42
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
## 2024-01-31 NOTE — DISCHARGE PLANNING
Medical Social Work    MSW spoke with triage RN and bedside RN about pt.  ERP called this worker after pt's UDS came back positive for THC and amphetamines.  MSW located pt's son in [facility] (Ronaldo Perdomo  10/28/2019).  SW contacted [facility] CPS and gave a report to Debra.  SW was then updated that pt and her son left after pt received her my chart results.  CPS will attempt to locate pt and her child to follow up.  Pt's listed address is [address] [place] [address] (which is [place]).  Pt also had a drivers license with the address of: [facility] [address], [address], [place] [address].  Addresses given to CPS.

## 2024-01-31 NOTE — ED NOTES
Pt eloped right after seeing ERP and ERP/this RN being in view at nurses station; after seeing UDS results on MyChart, pt ran out lobby exit w/ son   SW was already contacted, and updated on situation, is aware and following up

## 2024-01-31 NOTE — ED PROVIDER NOTES
ED Provider Note    CHIEF COMPLAINT  Chief Complaint   Patient presents with    Other     Pt coming in with concern for swelling to R leg, reporting she believes something is wrong with her heart or that she has scabies from someone who was in her car. Pt also brought in her son who she is concerned might have scabies from being in contact with her.       EXTERNAL RECORDS REVIEWED  Outpatient Notes patient was seen by ER provider 9/19/2022 with a rash and concern for scabies without evidence of such on exam at that time.  Patient was seen by family tradition her 6/22/2023 with concern for STDs.  At that time it was noted that she was using marijuana and an inhaled drug not otherwise specified.  Urine drug screen positive for amphetamines in 2019.  Negative for drugs 4 years ago.  HPI/ROS  LIMITATION TO HISTORY   Select: : None      Esmer Perdomo is a 33 y.o. female who presents to the emergency department for evaluation of itching and a rash to her lower extremities and concern for scabies.  The patient reports feeling like things crawling out of her skin which she is scraping off in the bathtub and is convinced that this is scabies.  She states that she has a burning-like tingling sensation in her legs that occasionally is all over her body.  She states that has been going on for 3 to 4 days.  She denies fevers or chills nausea or vomiting.  She does report some concern for sexually transmitted diseases but denies any symptoms of vaginal bleeding discharge dysuria nausea vomiting abdominal pain or additional symptoms.  She has not been sexually active in 1 month.  She reports marijuana use including today but denies any other drug use.      PAST MEDICAL HISTORY   has a past medical history of ASCUS of cervix with negative high risk HPV.    SURGICAL HISTORY   has a past surgical history that includes other orthopedic surgery (Right) and eye surgery (Right).    FAMILY HISTORY  Family History   Problem  "Relation Age of Onset    Diabetes Father     COPD Father     Heart Disease Maternal Grandfather         CABG    Hyperlipidemia Maternal Grandfather        SOCIAL HISTORY  Social History     Tobacco Use    Smoking status: Every Day     Current packs/day: 0.50     Average packs/day: 0.5 packs/day for 12.0 years (6.0 ttl pk-yrs)     Types: Cigarettes    Smokeless tobacco: Never    Tobacco comments:     quit smoking 4 months ago    Vaping Use    Vaping Use: Every day    Substances: Nicotine    Devices: Disposable   Substance and Sexual Activity    Alcohol use: Not Currently     Comment: denies    Drug use: Yes     Types: Inhaled, Marijuana     Comment: Marijuana     Sexual activity: Yes     Partners: Male     Birth control/protection: None       CURRENT MEDICATIONS  Home Medications       Reviewed by Fabiola Jane R.N. (Registered Nurse) on 01/30/24 at 2003  Med List Status: Partial     Medication Last Dose Status   aspirin (ASA) 325 MG Tab  Active                    ALLERGIES  No Known Allergies    PHYSICAL EXAM  VITAL SIGNS: BP (!) 145/81   Pulse (!) 105   Temp 36.9 °C (98.5 °F) (Temporal)   Resp 18   Ht 1.676 m (5' 6\")   Wt 59 kg (130 lb)   LMP 01/28/2024 (Approximate)   SpO2 98%   BMI 20.98 kg/m²    Constitutional: Anxious, perseverating about potential skin parasites  HEENT: Atraumatic, normocephalic, pupils are equal round reactive to light, nose normal, mouth shows moist mucous membranes  Neck: Supple, no JVD, no tracheal deviation  Cardiovascular: Mildly tachycardic, regular no murmur, rub or gallop, 2+ pulses peripherally x4  Thorax & Lungs: No respiratory distress, no wheezes, rales or rhonchi, no chest wall tenderness.  GI: Soft, non-distended, non-tender, no rebound  Skin: Warm, dry, scattered excoriations without evidence of significant rash, no linear lesions consistent with scabies  Musculoskeletal: Moving all extremities, no acute deformity, no edema, no tenderness  Neurologic: A&Ox3, at " baseline mentation, cranial nerves II through XII are grossly intact, no sensory deficit, no ataxia  Psychiatric: Anxious          DIAGNOSTIC STUDIES / PROCEDURES    LABS  Labs Reviewed   URINE DRUG SCREEN - Abnormal; Notable for the following components:       Result Value    Amphetamines Urine Positive (*)     Cannabinoid Metab Positive (*)     All other components within normal limits   CHLAMYDIA/GC, PCR (URINE)   HCG QUALITATIVE UR   T.PALLIDUM AB MATT (SCREENING)         COURSE & MEDICAL DECISION MAKING    ED Observation Status? No; Patient does not meet criteria for ED Observation.     INITIAL ASSESSMENT, COURSE AND PLAN  Care Narrative:     Patient presents to the emergency department for evaluation of potential scabies infestation.  Clinically no evidence of scabies or significant rash on exam but patient does have evidence of excoriated skin and dry skin.  Presentation more suspicious for delusional parasitosis possibly in the setting of sympathomimetic abuse.  Patient does have a remote history of amphetamine positive urine drug screen though recent drug screens have been negative and she denies this today.  She does present slightly tachycardic.  Will perform urine drug screen to further assess as I feel that sympathomimetic toxidrome is likely culprit of her presentation today.  Patient also reporting concern for sexually-transmitted diseases which we will screen for with urine gonorrhea and chlamydia and syphilis testing.  Patient does present today with her child who fortunately currently appears very well cared for and happy with no symptoms.  No specific concern for child abuse or neglect at this time however if patient is amphetamine positive I would be more concerned that CPS should be involved.  Case discussed with social work who recommend reaching back out if urine drug screen is positive.    Patient's urine drug screen is positive for amphetamines as well as cannabinoids.  Given such Case  discussed with social work who will file a report to CPS.  Patient able to see results of testing on MyChart and became upset and eloped from the department prior to social work intervention.  Social work plans to have CPS visit patient at home.  Unable to discuss results of STD testing with the patient prior to her eloping.  Unable to provide the patient with a prescription for permethrin prior to her leaving however I do not feel that her current symptoms are more suggestive of delusional parasitosis.      ADDITIONAL PROBLEM LIST  Amphetamine abuse    DISPOSITION AND DISCUSSIONS  I have discussed management of the patient with the following physicians and EMIR's: None    Discussion of management with other QHP or appropriate source(s): Social Work        FINAL DIAGNOSIS  1. Amphetamine use         Patient eloped from the emergency department    Electronically signed by: Main Cortez M.D., 1/30/2024 8:23 PM

## 2024-02-13 ENCOUNTER — TELEPHONE (OUTPATIENT)
Dept: HEALTH INFORMATION MANAGEMENT | Facility: OTHER | Age: 34
End: 2024-02-13
Payer: MEDICAID

## 2024-03-06 ENCOUNTER — OFFICE VISIT (OUTPATIENT)
Dept: MEDICAL GROUP | Facility: MEDICAL CENTER | Age: 34
End: 2024-03-06
Attending: FAMILY MEDICINE
Payer: MEDICAID

## 2024-03-06 VITALS
TEMPERATURE: 97.9 F | BODY MASS INDEX: 22.18 KG/M2 | HEIGHT: 66 IN | RESPIRATION RATE: 14 BRPM | DIASTOLIC BLOOD PRESSURE: 76 MMHG | HEART RATE: 100 BPM | SYSTOLIC BLOOD PRESSURE: 118 MMHG | OXYGEN SATURATION: 99 % | WEIGHT: 138 LBS

## 2024-03-06 DIAGNOSIS — L29.9 ITCHING: ICD-10-CM

## 2024-03-06 PROCEDURE — 3078F DIAST BP <80 MM HG: CPT | Performed by: FAMILY MEDICINE

## 2024-03-06 PROCEDURE — 3074F SYST BP LT 130 MM HG: CPT | Performed by: FAMILY MEDICINE

## 2024-03-06 PROCEDURE — 99213 OFFICE O/P EST LOW 20 MIN: CPT | Performed by: FAMILY MEDICINE

## 2024-03-06 PROCEDURE — 99212 OFFICE O/P EST SF 10 MIN: CPT | Performed by: FAMILY MEDICINE

## 2024-03-06 RX ORDER — PERMETHRIN 50 MG/G
1 CREAM TOPICAL ONCE
Qty: 30 G | Refills: 0 | Status: SHIPPED | OUTPATIENT
Start: 2024-03-06 | End: 2024-03-07 | Stop reason: SDUPTHER

## 2024-03-06 ASSESSMENT — FIBROSIS 4 INDEX: FIB4 SCORE: 0.42

## 2024-03-06 NOTE — PROGRESS NOTES
"Subjective:     CC:   Chief Complaint   Patient presents with    Itching     Possible scabies         HPI:     Itching  Patient feels like she has scabies, ongoing x 1 month   She has itching everywhere with bumps, currently mostly on her chin  She has been \"keeping it at bay\" with tea tree oil, sulfur creams, a lot of laundry   Went to ER, and there was concern for delusion of parasitosis. No treatments given  Syphilis, GC were negative at the time.          Past Medical History:   Diagnosis Date    ASCUS of cervix with negative high risk HPV        Social History     Tobacco Use    Smoking status: Every Day     Current packs/day: 0.50     Average packs/day: 0.5 packs/day for 12.0 years (6.0 ttl pk-yrs)     Types: Cigarettes    Smokeless tobacco: Never    Tobacco comments:     quit smoking 4 months ago    Vaping Use    Vaping Use: Every day    Substances: Nicotine    Devices: Disposable   Substance Use Topics    Alcohol use: Not Currently     Comment: denies    Drug use: Yes     Types: Inhaled, Marijuana     Comment: Marijuana        Current Outpatient Medications Ordered in Epic   Medication Sig Dispense Refill    permethrin (ELIMITE) 5 % Cream Apply 1 Application topically one time for 1 dose. Apply topically from head to toe, leave on overnight, and wash off in the morning. 30 g 0    aspirin (ASA) 325 MG Tab Take 650 mg by mouth one time. (Patient not taking: Reported on 3/6/2024)       No current [facility]-ordered facility-administered medications on file.       Allergies:  Patient has no known allergies.        Objective:       Exam:  /76 (BP Location: Left arm, Patient Position: Sitting)   Pulse 100   Temp 36.6 °C (97.9 °F) (Temporal)   Resp 14   Ht 1.676 m (5' 6\")   Wt 62.6 kg (138 lb)   SpO2 99%   BMI 22.27 kg/m²  Body mass index is 22.27 kg/m².    General: Normal appearing. No distress.  Skin: palmar erythema. No rashes. Slight redness around the chin. No skin lesions finger webbing        Data " reviewed:   Reviewed ER notes and testing    Assessment & Plan:     33 y.o. female with the following -     1. Itching  - permethrin (ELIMITE) 5 % Cream; Apply 1 Application topically one time for 1 dose. Apply topically from head to toe, leave on overnight, and wash off in the morning.  Dispense: 30 g; Refill: 0  Patient's presentation was not very consistent with scabies.  There were no skin lesions noted.  Despite this I did provide her with some permethrin.  I advised her that we can try it once, however if this does not help, we need to consider other possible diagnoses.  There was a possibility of delusional parasitosis from the ER.  She does have positive amphetamine on UDS recently.  She will return if sx continue despite permethrin treatment.    Return in about 2 weeks (around 3/20/2024).    Please note that this dictation was created using voice recognition software. I have made every reasonable attempt to correct obvious errors, but I expect that there are errors of grammar and possibly content that I did not discover before finalizing the note.

## 2024-03-06 NOTE — ASSESSMENT & PLAN NOTE
"Patient feels like she has scabies, ongoing x 1 month   She has itching everywhere with bumps, currently mostly on her chin  She has been \"keeping it at bay\" with tea tree oil, sulfur creams, a lot of laundry   Went to ER, and there was concern for delusion of parasitosis. No treatments given  Syphilis, GC were negative at the time.    "

## 2024-03-07 DIAGNOSIS — L29.9 ITCHING: ICD-10-CM

## 2024-03-07 NOTE — TELEPHONE ENCOUNTER
Received request via: Patient    Was the patient seen in the last year in this department? Yes    Does the patient have an active prescription (recently filled or refills available) for medication(s) requested? No    Pharmacy Name[facility]    Does the patient have group home Plus and need 100 day supply (blood pressure, diabetes and cholesterol meds only)? Patient does not have SCP    Future Appointments         Provider Department [facility]    3/27/2024 10:00 AM (Arrive by 9:45 AM) Duyen Smith M.D. [facility]

## 2024-03-11 RX ORDER — PERMETHRIN 50 MG/G
1 CREAM TOPICAL ONCE
Qty: 30 G | Refills: 0 | Status: SHIPPED | OUTPATIENT
Start: 2024-03-11 | End: 2024-03-11

## 2024-03-12 ENCOUNTER — APPOINTMENT (OUTPATIENT)
Dept: MEDICAL GROUP | Facility: MEDICAL CENTER | Age: 34
End: 2024-03-12
Attending: FAMILY MEDICINE
Payer: MEDICAID

## 2025-03-11 ENCOUNTER — HOSPITAL ENCOUNTER (OUTPATIENT)
Facility: MEDICAL CENTER | Age: 35
End: 2025-03-11
Attending: STUDENT IN AN ORGANIZED HEALTH CARE EDUCATION/TRAINING PROGRAM
Payer: MEDICAID

## 2025-03-11 ENCOUNTER — OFFICE VISIT (OUTPATIENT)
Dept: OBGYN | Facility: CLINIC | Age: 35
End: 2025-03-11
Payer: MEDICAID

## 2025-03-11 DIAGNOSIS — R23.8 VESICLE OF SKIN: ICD-10-CM

## 2025-03-11 DIAGNOSIS — N89.8 VAGINAL DISCHARGE: ICD-10-CM

## 2025-03-11 DIAGNOSIS — Z32.00 ENCOUNTER FOR PREGNANCY TEST, RESULT UNKNOWN: ICD-10-CM

## 2025-03-11 LAB
POCT INT CON NEG: NEGATIVE
POCT INT CON POS: POSITIVE
POCT URINE PREGNANCY TEST: NEGATIVE

## 2025-03-11 PROCEDURE — 88142 CYTOPATH C/V THIN LAYER: CPT

## 2025-03-11 PROCEDURE — 81025 URINE PREGNANCY TEST: CPT | Performed by: STUDENT IN AN ORGANIZED HEALTH CARE EDUCATION/TRAINING PROGRAM

## 2025-03-11 PROCEDURE — 87491 CHLMYD TRACH DNA AMP PROBE: CPT

## 2025-03-11 PROCEDURE — 87591 N.GONORRHOEAE DNA AMP PROB: CPT

## 2025-03-11 PROCEDURE — 87510 GARDNER VAG DNA DIR PROBE: CPT

## 2025-03-11 PROCEDURE — 87529 HSV DNA AMP PROBE: CPT | Mod: 91

## 2025-03-11 PROCEDURE — 99213 OFFICE O/P EST LOW 20 MIN: CPT | Performed by: STUDENT IN AN ORGANIZED HEALTH CARE EDUCATION/TRAINING PROGRAM

## 2025-03-11 PROCEDURE — 87660 TRICHOMONAS VAGIN DIR PROBE: CPT

## 2025-03-11 PROCEDURE — 87480 CANDIDA DNA DIR PROBE: CPT

## 2025-03-11 NOTE — PROGRESS NOTES
"[facility] Women[facility]/Gyn Visit    SUBJECTIVE:     Patient is a 34yoF  here for abnormal vaginal discharge. Patient reports onset of malodorous vaginal discharge about 2 months ago. The smell occurs for a few days at a time. Does notice increased vaginal discharge with the smell. Had tampon inserted a few days ago to help with discharge and smell, had difficulty with removing tampon due to internal defect. When pulling out tampon, saw clump of discharge that appeared like a \"squashed up noodle\", white in color, denies any bleeding. Denies any discharge since removing tampon. First started noticing malodorous smell about 1 year ago.    Reports associated D&C for surgical  2023.     Has also noticed painful bumps in the perianal area, burning/prickling pain with touch. Noticed some small amount of bleeding with wiping. Noticed this month, about 1 week ago. Denies any fever, chills, nausea, vomiting, constipation. Does report some diarrhea about 1 month ago. Has had this off and on for the past 1.5-2 years after having unprotected anal sex with prior male partner.     Patient's last menstrual period was approximately 2 weeks ago. Lasted for only 2 days, heavy at first then light. Usually has her period lasting 3 days at a time, heavy menstrual bleeding, regularly occurring.     Reports one current male partner. Had another new sexual partner about few months ago prior to this new partner. Not currently using birth control or barrier methods such as condoms. Partner has vasectomy.       O: There were no vitals taken for this visit.  Pelvic Exam: External genitalia visualized, vulva without lesions or excoriations. Perianal region showed two small, vesicular lesions at the 12:00 position at the anal verge. Speculum exam performed, yellow/white thin mucus noted. No ulcerations or lesions noted over cervix, which was easily visualized.       Lab:  Recent Results (from the past 2 weeks)   POCT Pregnancy    " Collection Time: 25  3:04 PM   Result Value Ref Range    POC Urine Pregnancy Test Negative     Internal Control Positive Positive     Internal Control Negative Negative        A/P:  34 y.o.  here for malodorous vaginal discharge and perianal lesions.    #Malodorous Vaginal Discharge  Given appearance on exam, concern for possible bacterial vaginosis. Also possible gonorrhea, chlamydia, trichomonas, etc. Has history of prior negative pap smear in , but given new symptoms will repeat pap smear for diagnostic purposes at today's visit along with BD Affirm testing.   - Speculum exam with Pap smear performed today in clinic  - Samples obtained for BD Affirm testing  - Will follow up with patient regarding testing results    #Perianal Lesions  Patient with painful, burning perianal lesions which appear vesicular in nature on pelvic exam, concerning for possible HSV infection given history of unprotected intercourse.   - HSV testing obtained given active lesions on exam    Stacey Avalos M.D.  PGY-2  [facility]

## 2025-03-11 NOTE — PROGRESS NOTES
Patient here for GYN visit.   Last seen on :   LMP : unknown  Pap :  04-05-22  Mammo : na  Pt states  been having some pain and smell put a tampon in and white solid stuff came out pt states she has to push her stomach to have a bowl movent   Phone/Pharmacy verified:[phone]

## 2025-03-12 ENCOUNTER — OFFICE VISIT (OUTPATIENT)
Dept: MEDICAL GROUP | Facility: MEDICAL CENTER | Age: 35
End: 2025-03-12
Attending: FAMILY MEDICINE
Payer: MEDICAID

## 2025-03-12 ENCOUNTER — RESULTS FOLLOW-UP (OUTPATIENT)
Dept: OBGYN | Facility: CLINIC | Age: 35
End: 2025-03-12
Payer: MEDICAID

## 2025-03-12 VITALS
DIASTOLIC BLOOD PRESSURE: 60 MMHG | HEIGHT: 66 IN | HEART RATE: 88 BPM | RESPIRATION RATE: 16 BRPM | SYSTOLIC BLOOD PRESSURE: 108 MMHG | WEIGHT: 148.3 LBS | OXYGEN SATURATION: 94 % | BODY MASS INDEX: 23.83 KG/M2 | TEMPERATURE: 97.3 F

## 2025-03-12 DIAGNOSIS — B96.89 BV (BACTERIAL VAGINOSIS): ICD-10-CM

## 2025-03-12 DIAGNOSIS — R19.8 ABDOMEN ENLARGED: ICD-10-CM

## 2025-03-12 DIAGNOSIS — K59.00 CONSTIPATION, UNSPECIFIED CONSTIPATION TYPE: ICD-10-CM

## 2025-03-12 DIAGNOSIS — K64.9 HEMORRHOIDS, UNSPECIFIED HEMORRHOID TYPE: ICD-10-CM

## 2025-03-12 DIAGNOSIS — N76.0 BV (BACTERIAL VAGINOSIS): ICD-10-CM

## 2025-03-12 LAB
CANDIDA DNA VAG QL PROBE+SIG AMP: NEGATIVE
G VAGINALIS DNA VAG QL PROBE+SIG AMP: POSITIVE
T VAGINALIS DNA VAG QL PROBE+SIG AMP: NEGATIVE

## 2025-03-12 PROCEDURE — 3078F DIAST BP <80 MM HG: CPT | Performed by: FAMILY MEDICINE

## 2025-03-12 PROCEDURE — 99212 OFFICE O/P EST SF 10 MIN: CPT | Performed by: FAMILY MEDICINE

## 2025-03-12 PROCEDURE — 99214 OFFICE O/P EST MOD 30 MIN: CPT | Performed by: FAMILY MEDICINE

## 2025-03-12 PROCEDURE — 3074F SYST BP LT 130 MM HG: CPT | Performed by: FAMILY MEDICINE

## 2025-03-12 RX ORDER — HYDROCORTISONE 25 MG/G
CREAM TOPICAL
Qty: 28 G | Refills: 0 | Status: SHIPPED | OUTPATIENT
Start: 2025-03-12

## 2025-03-12 RX ORDER — METRONIDAZOLE 500 MG/1
500 TABLET ORAL 2 TIMES DAILY
Qty: 14 TABLET | Refills: 0 | Status: SHIPPED | OUTPATIENT
Start: 2025-03-12 | End: 2025-03-19

## 2025-03-12 ASSESSMENT — FIBROSIS 4 INDEX: FIB4 SCORE: 0.44

## 2025-03-12 NOTE — PROGRESS NOTES
"    Subjective:     CC:   Chief Complaint   Patient presents with    Other         HPI:     Concerns after  2023  - abdomen sticks out - notes significant weight gain over the last year. Has had 3 prior pregnancies.   - periods are heavier  - also have tissue coming out with Bms. Bms can require straining, are hard. Not many fruits and vegetables in her diet. There is bleeding with BM, only on the tissue. Bms can be painful. Also with some anal itching.    BV  Pt had visit with ob/gyn yesterday. Vag swab did show positive bv.     Past Medical History:   Diagnosis Date    ASCUS of cervix with negative high risk HPV        Social History     Tobacco Use    Smoking status: Every Day     Current packs/day: 0.50     Average packs/day: 0.5 packs/day for 12.0 years (6.0 ttl pk-yrs)     Types: Cigarettes    Smokeless tobacco: Never    Tobacco comments:     quit smoking 4 months ago    Vaping Use    Vaping status: Every Day    Substances: Nicotine    Devices: Disposable   Substance Use Topics    Alcohol use: Not Currently     Comment: denies    Drug use: Yes     Types: Inhaled, Marijuana     Comment: Marijuana        Current Outpatient Medications Ordered in Epic   Medication Sig Dispense Refill    metroNIDAZOLE (FLAGYL) 500 MG Tab Take 1 Tablet by mouth 2 times a day for 7 days. 14 Tablet 0    hydrocortisone rectal (PERIANAL) 2.5% Cream Apply to anus once daily for 7 days 28 g 0    aspirin (ASA) 325 MG Tab Take 650 mg by mouth one time. (Patient not taking: Reported on 3/6/2024)       No current [facility]-ordered facility-administered medications on file.       Allergies:  Patient has no known allergies.        Objective:       Exam:  /60 (BP Location: Left arm, Patient Position: Sitting, BP Cuff Size: Adult)   Pulse 88   Temp 36.3 °C (97.3 °F) (Temporal)   Resp 16   Ht 1.676 m (5' 6\")   Wt 67.3 kg (148 lb 4.8 oz)   SpO2 94%   BMI 23.94 kg/m²  Body mass index is 23.94 kg/m².    Constitutional: Alert, no " distress, well-groomed.  Respiratory: Unlabored respiratory effort, no cough.  MSK: moves all extremities.  Psych: normal affect and mood.        Data reviewed:   Reviewed gyn note from yesterday    Assessment & Plan:     34 y.o. female with the following -     1. BV (bacterial vaginosis)  - metroNIDAZOLE (FLAGYL) 500 MG Tab; Take 1 Tablet by mouth 2 times a day for 7 days.  Dispense: 14 Tablet; Refill: 0  Sending in treatment for pos BV, foul odors.     2. Hemorrhoids, unspecified hemorrhoid type  3. Constipation, unspecified constipation type  - hydrocortisone rectal (PERIANAL) 2.5% Cream; Apply to anus once daily for 7 days  Dispense: 28 g; Refill: 0  Per pt's description, does sound like irritated hemorrhoid  Counseled on management of constipation  Start fiber supplementation and importance of soft stools, not sitting on toilet for too long.   Rectal steroids to help. Can also use OTC     4. Abdomen enlarged  Feels like her abdomen protrudes and is irritated when people as if she is pregnant. Advised that this is to be expected especially after 3 pregnancies and that this is not a sign of organ displacement inside the abdomen.    Return in about 2 months (around 5/12/2025).    Please note that this dictation was created using voice recognition software. I have made every reasonable attempt to correct obvious errors, but I expect that there are errors of grammar and possibly content that I did not discover before finalizing the note.

## 2025-03-13 LAB
C TRACH DNA GENITAL QL NAA+PROBE: NEGATIVE
N GONORRHOEA DNA GENITAL QL NAA+PROBE: NEGATIVE
SPECIMEN SOURCE: NORMAL

## 2025-03-14 LAB — THINPREP PAP, CYTOLOGY NL11781: NORMAL

## 2025-03-15 LAB
HSV1 DNA CSF QL NAA+PROBE: NOT DETECTED
HSV2 DNA CSF QL NAA+PROBE: NOT DETECTED
SPECIMEN SOURCE: NORMAL

## 2025-03-25 ENCOUNTER — APPOINTMENT (OUTPATIENT)
Dept: OBGYN | Facility: CLINIC | Age: 35
End: 2025-03-25
Payer: MEDICAID

## 2025-05-14 ENCOUNTER — OFFICE VISIT (OUTPATIENT)
Dept: MEDICAL GROUP | Facility: MEDICAL CENTER | Age: 35
End: 2025-05-14
Attending: FAMILY MEDICINE
Payer: MEDICAID

## 2025-05-14 VITALS
WEIGHT: 143 LBS | RESPIRATION RATE: 16 BRPM | TEMPERATURE: 97.3 F | SYSTOLIC BLOOD PRESSURE: 124 MMHG | HEART RATE: 87 BPM | HEIGHT: 66 IN | BODY MASS INDEX: 22.98 KG/M2 | DIASTOLIC BLOOD PRESSURE: 80 MMHG | OXYGEN SATURATION: 94 %

## 2025-05-14 DIAGNOSIS — L98.9 SKIN LESION: ICD-10-CM

## 2025-05-14 DIAGNOSIS — K64.9 HEMORRHOIDS, UNSPECIFIED HEMORRHOID TYPE: Primary | ICD-10-CM

## 2025-05-14 DIAGNOSIS — Z63.4 DEATH OF FAMILY MEMBER: ICD-10-CM

## 2025-05-14 PROBLEM — F41.1 GAD (GENERALIZED ANXIETY DISORDER): Status: ACTIVE | Noted: 2019-07-01

## 2025-05-14 PROCEDURE — 3079F DIAST BP 80-89 MM HG: CPT | Performed by: FAMILY MEDICINE

## 2025-05-14 PROCEDURE — 99213 OFFICE O/P EST LOW 20 MIN: CPT | Performed by: FAMILY MEDICINE

## 2025-05-14 PROCEDURE — 3074F SYST BP LT 130 MM HG: CPT | Performed by: FAMILY MEDICINE

## 2025-05-14 PROCEDURE — 99212 OFFICE O/P EST SF 10 MIN: CPT | Performed by: FAMILY MEDICINE

## 2025-05-14 SDOH — SOCIAL STABILITY - SOCIAL INSECURITY: DISSAPEARANCE AND DEATH OF FAMILY MEMBER: Z63.4

## 2025-05-14 ASSESSMENT — FIBROSIS 4 INDEX: FIB4 SCORE: 0.44

## 2025-05-14 NOTE — PROGRESS NOTES
Verbal consent was acquired by the patient to use [employer] ambient listening note generation during this visit   Subjective:     HPI:   History of Present Illness  The patient presents for a follow-up visit.    Wrist Pain, Bruising, and Heart Palpitations  She reports an incident in 02/2024 where she experienced severe wrist pain, which was accompanied by itching. Upon scratching, a bruise developed within 2 seconds, leading her to believe that a vein may have ruptured. She also experienced heart palpitations at that time but has not had any since. She expresses concern about the potential for this incident to have caused a blood clot.    Recurring Lesion  She mentions a lesion on the left groin that was previously excised but has since recurred in a doubled form. She is interested in having it re-excised and sutured to prevent further recurrence.    Mental Health Concerns  She has been receiving counseling via [facility] due to concerns about potential ADHD, which she believed was causing job loss. However, testing indicated that she is unlikely to have ADHD. Her counselor suggested that her symptoms could be due to severe anxiety mimicking ADHD. She has a history of significant trauma and chronic anxiety, which she attempted to manage independently. Despite her efforts, she continued to experience anxiety, which she initially perceived as normal. She is currently undergoing cognitive therapy every few weeks and is scheduled for another session in a month. She was prescribed clonazepam 0.5 mg, which has significantly improved her symptoms.    Lifestyle Factors  Pt had pap smear 2 mos ago and was normal. She received her childhood vaccinations in [place] and had chickenpox as a child. She declines the influenza and COVID-19 vaccines. She has no personal history of asthma but reports that her father had COPD and her sister has asthma. She has ceased smoking cigarettes and currently vapes nicotine, with plans to  "quit vaping as well.    Hemorrhoid Treatment  She has been using a cream for hemorrhoids as needed.        Objective:     Exam:  /80 (BP Location: Left arm, Patient Position: Sitting, BP Cuff Size: Adult)   Pulse 87   Temp 36.3 °C (97.3 °F) (Temporal)   Resp 16   Ht 1.676 m (5' 6\")   Wt 64.9 kg (143 lb)   SpO2 94%   BMI 23.08 kg/m²  Body mass index is 23.08 kg/m².    Constitutional: Alert, no distress, well-groomed.  Respiratory: Unlabored respiratory effort, no cough.  MSK: moves all extremities.  Psych: normal affect and mood.        Data:   None new    Assessment & Plan:     1. Hemorrhoids, unspecified hemorrhoid type        2. Death of family member        3. Skin lesion            Assessment & Plan  1. Bruising of the L wrist, > 1 year ago  - Discussion included the possibility of an ultrasound if similar symptoms occur again to rule out deep vein thrombosis.  - No immediate intervention required. No current swelling.    2. Skin lesion: Recurrent.  - Liquid nitrogen treatment was discussed as a potential option to kill the tissue more deeply.  - Referral to a dermatologist was also offered for further evaluation and treatment options.  - pt declined both    3. Anxiety: Chronic.  - Treated with clonazepam 0.5 mg with significant improvement in symptoms. Sees psychiatry  - Cognitive therapy is ongoing.  - Prescription Drug Monitoring Program was reviewed.    4. Hemorrhoids.  - Previously prescribed cream used intermittently.  - No current symptoms reported.  - Continue to use the cream as needed.    5. Health maintenance.  - Pap smear in 03/2025 was normal.  - Childhood vaccinations completed, had chickenpox as a child.  - Declines influenza and COVID-19 vaccines.  - No additional vaccinations needed at this time.    Follow-up  - Follow-up as needed.          Return if symptoms worsen or fail to improve.      Please note that this dictation was created using voice recognition software. I have made " every reasonable attempt to correct obvious errors, but I expect that there are errors of grammar and possibly content that I did not discover before finalizing the note.

## 2025-05-22 ENCOUNTER — GYNECOLOGY VISIT (OUTPATIENT)
Dept: OBGYN | Facility: CLINIC | Age: 35
End: 2025-05-22
Payer: MEDICAID

## 2025-05-22 ENCOUNTER — HOSPITAL ENCOUNTER (OUTPATIENT)
Facility: MEDICAL CENTER | Age: 35
End: 2025-05-22
Payer: MEDICAID

## 2025-05-22 VITALS — SYSTOLIC BLOOD PRESSURE: 120 MMHG | BODY MASS INDEX: 22.92 KG/M2 | DIASTOLIC BLOOD PRESSURE: 60 MMHG | WEIGHT: 142 LBS

## 2025-05-22 DIAGNOSIS — N89.8 VAGINAL LESION: Primary | ICD-10-CM

## 2025-05-22 PROBLEM — R87.610 ASCUS OF CERVIX WITH NEGATIVE HIGH RISK HPV: Status: RESOLVED | Noted: 2019-07-08 | Resolved: 2025-05-22

## 2025-05-22 LAB — AMBIGUOUS DTTM AMBI4: NORMAL

## 2025-05-22 PROCEDURE — 87529 HSV DNA AMP PROBE: CPT | Mod: 91

## 2025-05-22 PROCEDURE — 99213 OFFICE O/P EST LOW 20 MIN: CPT

## 2025-05-22 PROCEDURE — 3074F SYST BP LT 130 MM HG: CPT

## 2025-05-22 PROCEDURE — 3078F DIAST BP <80 MM HG: CPT

## 2025-05-22 ASSESSMENT — FIBROSIS 4 INDEX: FIB4 SCORE: 0.44
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
## 2025-05-25 LAB
HSV1 DNA CSF QL NAA+PROBE: NOT DETECTED
HSV2 DNA CSF QL NAA+PROBE: DETECTED
SPECIMEN SOURCE: ABNORMAL

## 2025-05-27 ENCOUNTER — RESULTS FOLLOW-UP (OUTPATIENT)
Dept: OBGYN | Facility: CLINIC | Age: 35
End: 2025-05-27
Payer: MEDICAID

## 2025-05-27 RX ORDER — ACYCLOVIR 400 MG/1
400 TABLET ORAL 3 TIMES DAILY
Qty: 15 TABLET | Refills: 2 | Status: SHIPPED | OUTPATIENT
Start: 2025-05-27 | End: 2025-06-01

## 2025-07-29 ENCOUNTER — APPOINTMENT (OUTPATIENT)
Dept: MEDICAL GROUP | Facility: MEDICAL CENTER | Age: 35
End: 2025-07-29
Payer: MEDICAID